# Patient Record
Sex: FEMALE | Race: WHITE | NOT HISPANIC OR LATINO | Employment: OTHER | URBAN - METROPOLITAN AREA
[De-identification: names, ages, dates, MRNs, and addresses within clinical notes are randomized per-mention and may not be internally consistent; named-entity substitution may affect disease eponyms.]

---
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
## 2020-02-07 ENCOUNTER — COMMUNICATION - HEALTHEAST (OUTPATIENT)
Dept: SCHEDULING | Facility: CLINIC | Age: 20
End: 2020-02-07

## 2020-02-10 ENCOUNTER — COMMUNICATION - HEALTHEAST (OUTPATIENT)
Dept: FAMILY MEDICINE | Facility: CLINIC | Age: 20
End: 2020-02-10

## 2020-02-10 ENCOUNTER — AMBULATORY - HEALTHEAST (OUTPATIENT)
Dept: FAMILY MEDICINE | Facility: CLINIC | Age: 20
End: 2020-02-10

## 2020-02-10 DIAGNOSIS — B37.0 THRUSH: ICD-10-CM

## 2020-07-05 ENCOUNTER — COMMUNICATION - HEALTHEAST (OUTPATIENT)
Dept: FAMILY MEDICINE | Facility: CLINIC | Age: 20
End: 2020-07-05

## 2020-07-05 DIAGNOSIS — B37.0 THRUSH: ICD-10-CM

## 2020-08-21 ENCOUNTER — COMMUNICATION - HEALTHEAST (OUTPATIENT)
Dept: FAMILY MEDICINE | Facility: CLINIC | Age: 20
End: 2020-08-21

## 2020-08-21 DIAGNOSIS — B37.0 THRUSH: ICD-10-CM

## 2020-09-18 ENCOUNTER — COMMUNICATION - HEALTHEAST (OUTPATIENT)
Dept: FAMILY MEDICINE | Facility: CLINIC | Age: 20
End: 2020-09-18

## 2020-09-18 DIAGNOSIS — Z30.9 ENCOUNTER FOR CONTRACEPTIVE MANAGEMENT, UNSPECIFIED TYPE: ICD-10-CM

## 2020-12-16 ENCOUNTER — COMMUNICATION - HEALTHEAST (OUTPATIENT)
Dept: FAMILY MEDICINE | Facility: CLINIC | Age: 20
End: 2020-12-16

## 2020-12-16 DIAGNOSIS — Z30.9 ENCOUNTER FOR CONTRACEPTIVE MANAGEMENT, UNSPECIFIED TYPE: ICD-10-CM

## 2021-01-22 ENCOUNTER — COMMUNICATION - HEALTHEAST (OUTPATIENT)
Dept: FAMILY MEDICINE | Facility: CLINIC | Age: 21
End: 2021-01-22

## 2021-01-22 DIAGNOSIS — B37.0 THRUSH: ICD-10-CM

## 2021-01-25 RX ORDER — FLUCONAZOLE 10 MG/ML
POWDER, FOR SUSPENSION ORAL
Qty: 70 ML | Refills: 0 | Status: SHIPPED | OUTPATIENT
Start: 2021-01-25 | End: 2022-10-12

## 2021-03-03 ENCOUNTER — COMMUNICATION - HEALTHEAST (OUTPATIENT)
Dept: FAMILY MEDICINE | Facility: CLINIC | Age: 21
End: 2021-03-03

## 2021-03-03 DIAGNOSIS — Z30.9 ENCOUNTER FOR CONTRACEPTIVE MANAGEMENT, UNSPECIFIED TYPE: ICD-10-CM

## 2021-05-27 NOTE — TELEPHONE ENCOUNTER
RN cannot approve Refill Request    RN can NOT refill this medication med is not covered by policy/route to provider.    Asa Sepulveda, [facility] Triage/Med Refill 4/9/2019    Requested Prescriptions   Pending Prescriptions Disp Refills     medroxyPROGESTERone (DEPO-PROVERA) 150 mg/mL injection [Pharmacy Med Name: medroxyPROGESTERone Acetate Intramuscular Suspension 150 MG/ML] 1 mL 0     Sig: INJECT 1 ML (150 MG TOTAL) INTO THE SHOULDER, THIGH, OR BUTTOCKS EVERY 3 (THREE) MONTHS.       There is no refill protocol information for this order            English

## 2021-05-27 NOTE — TELEPHONE ENCOUNTER
RN cannot approve Refill Request    RN can NOT refill this medication med is not covered by policy/route to provider.    Asa Sepulveda, [facility] Triage/Med Refill 4/9/2019    Requested Prescriptions   Pending Prescriptions Disp Refills     medroxyPROGESTERone (DEPO-PROVERA) 150 mg/mL injection 1 mL 0     Sig: Inject 1 mL (150 mg total) into the shoulder, thigh, or buttocks every 3 (three) months.       There is no refill protocol information for this order

## 2021-05-29 ENCOUNTER — RECORDS - HEALTHEAST (OUTPATIENT)
Dept: ADMINISTRATIVE | Facility: CLINIC | Age: 21
End: 2021-05-29

## 2021-05-29 NOTE — TELEPHONE ENCOUNTER
RN cannot approve Refill Request    RN can NOT refill this medication med is not covered by policy/route to provider.    Asa Sepulveda, [facility] Triage/Med Refill 6/12/2019    Requested Prescriptions   Pending Prescriptions Disp Refills     medroxyPROGESTERone (DEPO-PROVERA) 150 mg/mL injection 1 mL 0     Sig: Inject 1 mL (150 mg total) into the shoulder, thigh, or buttocks every 3 (three) months.       There is no refill protocol information for this order

## 2021-05-30 ENCOUNTER — RECORDS - HEALTHEAST (OUTPATIENT)
Dept: ADMINISTRATIVE | Facility: CLINIC | Age: 21
End: 2021-05-30

## 2021-05-31 VITALS — HEIGHT: 64 IN | WEIGHT: 131.5 LBS | BODY MASS INDEX: 22.45 KG/M2

## 2021-05-31 VITALS — WEIGHT: 132 LBS | HEIGHT: 64 IN | BODY MASS INDEX: 22.53 KG/M2

## 2021-05-31 NOTE — TELEPHONE ENCOUNTER
RN cannot approve Refill Request    RN can NOT refill this medication med is not covered by policy/route to provider.     Rupali Faustin, [facility] Triage/Med Refill 8/15/2019    Requested Prescriptions   Pending Prescriptions Disp Refills     hydrOXYzine HCl (ATARAX) 10 mg/5 mL syrup [Pharmacy Med Name: hydrOXYzine HCl Oral Syrup 10 MG/5ML] 473 mL 0     Sig: Take 12.5 mL (25 mg total) by mouth every 8 (eight) hours as needed for anxiety.       Antihistamine Refill Protocol Passed - 8/15/2019 12:38 PM        Passed - Patient has had office visit/physical in last year     Last office visit with prescriber/PCP: 1/28/2019 Alf Yeboah MD OR same dept: 1/28/2019 Alf Yeobah MD OR same specialty: 1/28/2019 Alf Yeboah MD  Last physical: 10/23/2018 Last MTM visit: Visit date not found   Next visit within 3 mo: Visit date not found  Next physical within 3 mo: Visit date not found  Prescriber OR PCP: Alf Yeboah MD  Last diagnosis associated with med order: There are no diagnoses linked to this encounter.  If protocol passes may refill for 12 months if within 3 months of last provider visit (or a total of 15 months).             pedi multivit 158-iron-vit K1 (CEROVITE JR) 18 mg iron- 10 mcg Chew [Pharmacy Med Name: Cerovite Jr Oral Tablet Chewable 60 MG] 150 tablet 10     Sig: CHEW AND SWALLOW TWO TABLETS DAILY       There is no refill protocol information for this order

## 2021-06-02 VITALS — WEIGHT: 133 LBS | HEIGHT: 64 IN | BODY MASS INDEX: 22.71 KG/M2

## 2021-06-02 VITALS — WEIGHT: 131 LBS | BODY MASS INDEX: 22.36 KG/M2 | HEIGHT: 64 IN

## 2021-06-04 NOTE — TELEPHONE ENCOUNTER
Refill Approved    Rx renewed per Medication Renewal Policy. Medication was last renewed on 8/15/19.    Effie De La Paz, [facility] Triage/Med Refill 12/23/2019     Requested Prescriptions   Pending Prescriptions Disp Refills     hydrOXYzine HCl (ATARAX) 10 mg/5 mL syrup [Pharmacy Med Name: hydrOXYzine HCl Oral Syrup 10 MG/5ML] 473 mL 0     Sig: Take 12.5 mL (25 mg total) by mouth every 8 (eight) hours as needed for anxiety.       Antihistamine Refill Protocol Passed - 12/20/2019 12:35 PM        Passed - Patient has had office visit/physical in last year     Last office visit with prescriber/PCP: 1/28/2019 Alf Yeboah MD OR same dept: 1/28/2019 Alf Yeboah MD OR same specialty: 1/28/2019 Alf Yeboah MD  Last physical: 10/23/2018 Last MTM visit: Visit date not found   Next visit within 3 mo: Visit date not found  Next physical within 3 mo: Visit date not found  Prescriber OR PCP: Alf Yeboah MD  Last diagnosis associated with med order: 1. Mood disorder (H)  - hydrOXYzine HCl (ATARAX) 10 mg/5 mL syrup [Pharmacy Med Name: hydrOXYzine HCl Oral Syrup 10 MG/5ML]; Take 12.5 mL (25 mg total) by mouth every 8 (eight) hours as needed for anxiety.  Dispense: 473 mL; Refill: 0    If protocol passes may refill for 12 months if within 3 months of last provider visit (or a total of 15 months).

## 2021-06-05 NOTE — TELEPHONE ENCOUNTER
Pt mother called in states she want to request mouth thrush medication for the Pt.    Please advise      Carson Owens RN, [facility] Triage/Med Refill 2/7/2020 7:19 PM

## 2021-06-06 NOTE — TELEPHONE ENCOUNTER
Medication Question or Clarification  Who is calling: Mother and patient  What medication are you calling about (include dose and sig)?:   fluconazole (DIFLUCAN) 40 mg/mL suspension 17.5 mL 0 2/10/2020 2/17/2020    Sig - Route: Take 2.5 mL (100 mg total) by mouth daily for 7 doses. - Oral    Sent to pharmacy as: fluconazole 40 mg/mL oral suspension (DIFLUCAN)        Who prescribed the medication?: Dr Yeboah  What is your question/concern?: Patient wants pill form of this medication.  Please send new form to pharmacy and call when sent.  Requested [facility]acy: [facility]  Okay to leave a detailed message?: Yes

## 2021-06-09 NOTE — TELEPHONE ENCOUNTER
RN cannot approve Refill Request    RN can NOT refill this medication med is not covered by policy/route to provider. Last office visit: 1/28/2019 Alf Yeboah MD Last Physical: 10/23/2018 Last MTM visit: Visit date not found Last visit same specialty: 1/28/2019 Alf Yeboah MD.  Next visit within 3 mo: Visit date not found  Next physical within 3 mo: Visit date not found      Brianda Malik, [facility] Triage/Med Refill 7/5/2020    Requested Prescriptions   Pending Prescriptions Disp Refills     fluconazole (DIFLUCAN) 40 mg/mL suspension [Pharmacy Med Name: Fluconazole Oral Suspension Reconstituted 40 MG/ML] 35 mL 0     Sig: TAKE 2.5 ML (100 MG TOTAL) BY MOUTH DAILY FOR 7 DAYS. DISCARD REMAINDER       There is no refill protocol information for this order

## 2021-06-10 NOTE — TELEPHONE ENCOUNTER
RN cannot approve Refill Request    RN can NOT refill this medication med is not covered by policy/route to provider. Last office visit: 1/28/2019 Alf Yeboah MD Last Physical: 10/23/2018 Last MTM visit: Visit date not found Last visit same specialty: 1/28/2019 Alf Yeboah MD.  Next visit within 3 mo: Visit date not found  Next physical within 3 mo: Visit date not found      Andreia Corona, [facility] Triage/Med Refill 8/22/2020    Requested Prescriptions   Pending Prescriptions Disp Refills     fluconazole (DIFLUCAN) 10 mg/mL suspension [Pharmacy Med Name: Fluconazole Oral Suspension Reconstituted 10 MG/ML] 70 mL 0     Sig: TAKE 10 ML (100 MG) BY MOUTH ONCE DAILY FOR 7 DAYS.       There is no refill protocol information for this order

## 2021-06-10 NOTE — TELEPHONE ENCOUNTER
Alfredo Yeboah     It's Km I'm writing you because Dea needs a new prescription for her mouth problem the medication is called   Fluconazole   Right now I'm not feeling the best and Piper just told me she's having that mouth problem again and because we cannot get into the dentist yet I'm emailing you     Km moody mother of Dea Moody

## 2021-06-11 NOTE — TELEPHONE ENCOUNTER
RN cannot approve Refill Request    RN can NOT refill this medication Protocol failed and NO refill given. Last office visit: 1/28/2019 Alf Yeboah MD Last Physical: 10/23/2018 Last MTM visit: Visit date not found Last visit same specialty: 1/28/2019 Alf Yeboah MD.  Next visit within 3 mo: Visit date not found  Next physical within 3 mo: Visit date not found      Andreia Corona, [facility] Triage/Med Refill 9/19/2020    Requested Prescriptions   Pending Prescriptions Disp Refills     medroxyPROGESTERone 150 mg/mL Syrg [Pharmacy Med Name: medroxyPROGESTERone Acetate Intramuscular Suspension Prefilled Syringe 150 MG/ML] 1 mL 0     Sig: INJECT 1 ML (150 MG TOTAL) INTO THE SHOULDER, THIGH, OR BUTTOCKS EVERY 3 (THREE) MONTHS.       There is no refill protocol information for this order

## 2021-06-13 NOTE — PROGRESS NOTES
[facility] Well Child Check    ASSESSMENT & PLAN  Dea Garcia is a 16  y.o. 9  m.o. who has normal growth and normal development.    Diagnoses and all orders for this visit:    Hearing deficit, right  -     Ambulatory referral to Audiology    Poor dentition  -     Ambulatory referral to Dentistry    Encounter for vision screening  -     Amb referral to Pediatric Ophthalmology    Acne vulgaris  -     benzoyl peroxide 7 % Clsr; Apply  Night to face and back  Dispense: 1 Bottle; Refill: 12    Vitamin D deficiency  -     cholecalciferol, vitamin D3, (CHILDREN'S VITAMIN D) 400 unit Chew; Chew 2 tablets daily  Dispense: 100 tablet; Refill: 12    Other orders  -     pediatric multivitamin (ANIMAL SHAPE VITAMINS) Chew chewable tablet; CHEW AND SWALLOW TWO TABLETS DAILY  Dispense: 150 each; Refill: 11  -     clindamycin (CLINDAGEL) 1 % gel; Apply to face at bedtime  Dispense: 60 g; Refill: 3      Return to clinic in 1 year for a Well Child Check or sooner as needed    IMMUNIZATIONS/LABS  Immunizations were reviewed and orders were placed as appropriate.    REFERRALS  Dental:  Recommend routine dental care as appropriate.  Other:  Patient was referred back to me for dental and Ophthalmologist     ANTICIPATORY GUIDANCE  Nutrition:  Junk Food    HEALTH HISTORY  Do you have any concerns that you'd like to discuss today?: BACK AND CHEST PAIN ON AND OFF       Refills needed? No    Do you have any forms that need to be filled out? No        Do you have any significant health concerns in your family history?: Yes: DM, LUPUS, THYROID, CHOLESTEROL, DEPRESSION, ANXIETY    Family History   Problem Relation Age of Onset     Anxiety disorder Mother      Anxiety disorder Maternal Aunt      Since your last visit, have there been any major changes in your family, such as a move, job change, separation, divorce, or death in the family?: No    Home  Who lives in your home?:  MOM, GRANDMA, AUNTY, SISTER, AND PT   Social History      Social History Narrative     Do you have any trouble with sleep?:  No    Education  What school does your child attend?:  [employer]   What grade is your child in?:  11th  How does the patient perform in school (grades, behavior, attention, homework?: AUTISM AND DELAY. DISCUSS TODAY WITH DOCTOR, DOING BETTER.      Eating  Does patient eat regular meals including fruits and vegetables?:  yes  What is the patient drinking (cow's milk, water, soda, juice, sports drinks, energy drinks, etc)?: soda  Does patient have concerns about body or appearance?:  No    Activities  Does the patient have friends?:  no, WHERE THEY LIVE NOT A LOT OF KIDS AND HOMEBODY. PT LIKES TO STAY HOME.   Does the patient get at least one hour of physical activity per day?:  Yes, WALKING.   Does the patient have less than 2 hours of screen time per day (aside from homework)?:  No, WILL USE TABLET FOR GAMES AND SCHOOL WORK.   What does your child do for exercise?:  PT DOES A LOT OF WALKING   Does the patient have interest/participate in community activities/volunteers/school sports?:  no    MENTAL HEALTH SCREENING  PHQ-2 Total Score: 0 (9/21/2017  1:27 PM)  No Data Recorded    VISION/HEARING  Vision: Completed. See Results  Hearing:  Completed. See Results     Hearing Screening    125Hz 250Hz 500Hz 1000Hz 2000Hz 3000Hz 4000Hz 6000Hz 8000Hz   Right ear:   0 0 40  40     Left ear:   40 40 20  20        Visual Acuity Screening    Right eye Left eye Both eyes   Without correction: 20/32 20/32 20/32   With correction:          TB Risk Assessment:  The patient and/or parent/guardian answer positive to:  NONE    Dental  Is your child being seen by a dentist?  Yes  Flouride Varnish Application Screening  Is child seen by dentist?     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     Acne     Childhood Onset Pervasive Developmental Autistic Disorder Full Syndrome Present     Overweight     Tachycardia      "Hypercalcemia     Vasovagal Syncope     Abdominal Pain Of Childhood     Dysfunctional Uterine Bleeding     Attention-deficit Hyperactivity Disorder       Drugs  Does the patient use tobacco/alcohol/drugs?:  no    Safety  Does the patient have any safety concerns (peer or home)?:  no  Does the patient use safety belts, helmets and other safety equipment?:  no    Sex  Is the patient sexually active?:  no    MEASUREMENTS  Height:  5' 3.5\" (1.613 m)  Weight: 131 lb 8 oz (59.6 kg)  BMI: Body mass index is 22.93 kg/(m^2).  Blood Pressure: 122/76  Blood pressure percentiles are 85 % systolic and 82 % diastolic based on NHBPEP's 4th Report. Blood pressure percentile targets: 90: 125/80, 95: 128/84, 99 + 5 mmH/97.    PHYSICAL EXAM  Physical:  General Appearance: Healthy-appearingy.   Eyes: Sclerae white, pupils equal and reactive, red reflex normal bilaterally   Ears: Well-positioned, well-formed pinnae; TM pearly white, translucent, no bulging   Nose: Clear, normal mucosa   Throat: Lips, tongue, and mucosa are moist, pink and intact; tongue no thrush   Neck: Supple, symmetric ROM  Chest: Lungs clear to auscultation, no retractions  Heart: Regular rate & rhythm, S1 S2, no murmur  Abdomen: Soft, non-tender, no masses; umbilical area normal   Pulses: Equal femoral pulses  Extremities: Well-perfused, warm and dry kyphosis  Neuro: Easily aroused good tone      "

## 2021-06-13 NOTE — TELEPHONE ENCOUNTER
RN cannot approve Refill Request    RN can NOT refill this medication med is not covered by policy/route to provider. Last office visit: 1/28/2019 Alf Yeboah MD Last Physical: 10/23/2018 Last MTM visit: Visit date not found Last visit same specialty: 1/28/2019 Alf Yeboah MD.  Next visit within 3 mo: Visit date not found  Next physical within 3 mo: Visit date not found      Rupali Faustin, [facility] Triage/Med Refill 12/18/2020    Requested Prescriptions   Pending Prescriptions Disp Refills     medroxyPROGESTERone 150 mg/mL Syrg [Pharmacy Med Name: medroxyPROGESTERone Acetate Intramuscular Suspension Prefilled Syringe 150 MG/ML] 1 mL 0     Sig: INJECT 1 ML (150 MG TOTAL) INTO THE SHOULDER, THIGH, OR BUTTOCKS EVERY 3 (THREE) MONTHS.       There is no refill protocol information for this order

## 2021-06-14 ENCOUNTER — RECORDS - HEALTHEAST (OUTPATIENT)
Dept: ADMINISTRATIVE | Facility: OTHER | Age: 21
End: 2021-06-14

## 2021-06-14 DIAGNOSIS — Z30.9 ENCOUNTER FOR CONTRACEPTIVE MANAGEMENT, UNSPECIFIED TYPE: ICD-10-CM

## 2021-06-14 RX ORDER — MEDROXYPROGESTERONE ACETATE 150 MG/ML
INJECTION, SUSPENSION INTRAMUSCULAR
Qty: 1 ML | Refills: 1 | Status: SHIPPED | OUTPATIENT
Start: 2021-06-14 | End: 2021-12-21

## 2021-06-14 NOTE — TELEPHONE ENCOUNTER
RN cannot approve Refill Request    RN can NOT refill this medication med is not covered by policy/route to provider. Last office visit: 1/28/2019 Alf Yeboah MD Last Physical: 10/23/2018 Last MTM visit: Visit date not found Last visit same specialty: 1/28/2019 Alf Yeboah MD.  Next visit within 3 mo: Visit date not found  Next physical within 3 mo: Visit date not found      Yana Schneider, [facility] Triage/Med Refill 1/22/2021    Requested Prescriptions   Pending Prescriptions Disp Refills     fluconazole (DIFLUCAN) 10 mg/mL suspension [Pharmacy Med Name: Fluconazole Oral Suspension Reconstituted 10 MG/ML] 70 mL 0     Sig: TAKE 10 ML (100 MG) BY MOUTH ONCE DAILY FOR 7 DAYS.       There is no refill protocol information for this order

## 2021-06-14 NOTE — PROGRESS NOTES
Audiology only; referred by Alf Yeboah    History:  Referred on PCP hearing screening 9-21-17. Mom reported a strong familial history of hearing loss, citing various etiologies. There are no concerns for Aracelis hearing at home, and academics are not suffering at present. Dea denied any recent illness, otalgia, or otorrhea. She has a diagnosis of being on the autism spectrum.    Results:  Otoscopy was unremarkable in either ear; both canals were clear.  Conventional audiometry; good reliability using circumaural phones.  Normal hearing sensitivity, bilaterally, for 250-8000 Hz.  Speech reception thresholds showed agreement with frequency-specific responses for each ear.  Tympanometry was consistent with normal middle ear function, bilaterally.    Recommendations:  Follow-up with PCP; retest hearing per medical management or patient/parental concern.  Hearing sensitivity and middle ear function are both adequate at this time in both ears for continued development and academic progress.    Chaparro Garibay, [facility]  [place] Licensed Audiologist [id number]

## 2021-06-15 NOTE — PROGRESS NOTES
"ASSESSMENT & PLAN      Dea was seen today for shortness of breath.    Diagnoses and all orders for this visit:    Tachycardia  -     Comprehensive Metabolic Panel  -     HM1(CBC and Differential)  -     Vitamin D, Total (25-Hydroxy)  -     Cancel: Urinalysis-UC if Indicated  -     Vitamin B12  -     Thyroid Cascade  -     Magnesium  -     Urinalysis-UC if Indicated  -     Thyroid Peroxidase Antibody; Future  -     HM1 (CBC with Diff)  -     Thyroid Peroxidase Antibody  -     Culture, Urine    Mood disorder  -     Ambulatory referral to Psychology    Hypokalemia  -     Comprehensive Metabolic Panel  -     Thyroid Cascade  -     Magnesium      No Follow-up on file.           CHIEF COMPLAINT: Dea Garcia had concerns including Shortness of Breath.    [place]: 1.............. had concerns including Shortness of Breath.    1. Tachycardia    2. Mood disorder    3. Hypokalemia      No problem-specific Assessment & Plan notes found for this encounter.      CC:              Shortness of breath without any exertion    Panicky\"    New Symptom  Why are cry?   Sib and Rents    School Home School  Since      What's it like:                      Hard to breathe  How long is it ongoin month symptoms sporadically since thanksgiving  What makes it worse :       Sure may be situational  What makes it better:         Taking a timeout relaxing  0/10-10/10:Pain/Intesity     sometimes severe    Any associated Sx to above complaint:   Hard to breathe    Family history significant significant for anxiety lives with her mom, on T, grandmother, sister  Being homeschooled   tobacco use but exposure  Plan Depo-Provera bowel and bladder okay    SUBJECTIVE:  Dea Garcia is a 17 y.o. female    No past medical history on file.  No past surgical history on file.  Azithromycin; Influenza virus vaccine whole; and Red dye  Current Outpatient Prescriptions   Medication Sig Dispense Refill     benzoyl peroxide 7 % " Clsr Apply  Night to face and back 1 Bottle 12     cholecalciferol, vitamin D3, (CHILDREN'S VITAMIN D) 400 unit Chew Chew 2 tablets daily 100 tablet 12     clindamycin (CLINDAGEL) 1 % gel Apply to face at bedtime 60 g 3     pediatric multivitamin (ANIMAL SHAPE VITAMINS) Chew chewable tablet CHEW AND SWALLOW TWO TABLETS DAILY 150 each 11     hydrOXYzine HCl (ATARAX) 25 MG tablet Take 1 tablet (25 mg total) by mouth every 8 (eight) hours as needed. For anxiety symptoms. 28 tablet 0     Current Facility-Administered Medications   Medication Dose Route Frequency Provider Last Rate Last Dose     medroxyPROGESTERone injection 150 mg (DEPO-PROVERA)  150 mg Intramuscular Q3 Months Alf Yeboah MD   150 mg at 11/16/17 1446     Family History   Problem Relation Age of Onset     Anxiety disorder Mother      Anxiety disorder Maternal Aunt      Social History     Social History     Marital status: Single     Spouse name: N/A     Number of children: N/A     Years of education: N/A     Social History Main Topics     Smoking status: Never Smoker     Smokeless tobacco: Never Used     Alcohol use No     Drug use: No     Sexual activity: Not Currently     Other Topics Concern     None     Social History Narrative     Patient Active Problem List   Diagnosis     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 She has never used smokeless tobacco. She reports that she does not drink alcohol or use illicit drugs.    REVIEW OF SYSTEMS:   Family history not pertinent to chief complaint or presenting problem    Review of systems otherwise negative as requested from patient, except   Those  "positive ROS outlined and discussed in [place].    OBJECTIVE:  /60 (Patient Site: Left Arm, Patient Position: Sitting, Cuff Size: Adult Regular)  Pulse 102  Temp 98.1  F (36.7  C) (Oral)   Ht 5' 3.5\" (1.613 m)  Wt 132 lb (59.9 kg)  SpO2 98%  BMI 23.02 kg/m2    GENERAL:     No acute distress.   Alert and oriented X 3         Physical:    Full range of affect  Extra tooth upper gumline on the right with decay  Some gingival changes  Oropharynx clear  Neck is supple no cervical or subclavicular nodes  Lungs are clear  Cardiac S1-S2 regular sinus appreciable murmur gallop  Abdomen is soft nontender no appreciable stool in the left lower quadrant or right lower quadrant no rebound or guarding no appreciable mass CVAs nontender no appreciable inguinal hernia    Recent Results (from the past 240 hour(s))   Comprehensive Metabolic Panel   Result Value Ref Range    Sodium 141 136 - 145 mmol/L    Potassium 3.6 3.5 - 5.0 mmol/L    Chloride 107 98 - 107 mmol/L    CO2 23 22 - 31 mmol/L    Anion Gap, Calculation 11 5 - 18 mmol/L    Glucose 85 70 - 125 mg/dL    BUN 7 (L) 9 - 18 mg/dL    Creatinine 0.76 0.60 - 1.10 mg/dL    GFR MDRD Af Amer  >60 mL/min/1.73m2    GFR MDRD Non Af Amer  >60 mL/min/1.73m2    Bilirubin, Total 0.7 0.0 - 1.0 mg/dL    Calcium 10.0 8.5 - 10.5 mg/dL    Protein, Total 7.6 6.0 - 8.0 g/dL    Albumin 4.3 3.5 - 5.0 g/dL    Alkaline Phosphatase 79 50 - 364 U/L    AST 12 0 - 40 U/L    ALT 10 0 - 45 U/L   Vitamin D, Total (25-Hydroxy)   Result Value Ref Range    Vitamin D, Total (25-Hydroxy) 24.1 (L) 30.0 - 80.0 ng/mL   Vitamin B12   Result Value Ref Range    Vitamin B-12 455 213 - 816 pg/mL   Thyroid Cascade   Result Value Ref Range    TSH 2.57 0.30 - 5.00 uIU/mL   Magnesium   Result Value Ref Range    Magnesium 2.0 1.8 - 2.6 mg/dL   HM1 (CBC with Diff)   Result Value Ref Range    WBC 5.9 4.5 - 13.0 thou/uL    RBC 4.84 4.10 - 5.10 mill/uL    Hemoglobin 14.1 12.0 - 16.0 g/dL    Hematocrit 42.2 33.0 - 51.0 % "    MCV 87 78 - 102 fL    MCH 29.2 25.0 - 35.0 pg    MCHC 33.5 32.0 - 36.0 g/dL    RDW 11.3 (L) 11.5 - 14.0 %    Platelets 251 140 - 440 thou/uL    MPV 8.5 7.0 - 10.0 fL    Neutrophils % 58 34 - 64 %    Lymphocytes % 32 25 - 45 %    Monocytes % 8 (H) 3 - 6 %    Eosinophils % 2 0 - 3 %    Basophils % 1 0 - 1 %    Neutrophils Absolute 3.4 1.5 - 9.5 thou/uL    Lymphocytes Absolute 1.9 1.1 - 6.0 thou/uL    Monocytes Absolute 0.5 0.1 - 0.8 thou/uL    Eosinophils Absolute 0.1 0.0 - 0.4 thou/uL    Basophils Absolute 0.0 0.0 - 0.1 thou/uL   Thyroid Peroxidase Antibody   Result Value Ref Range    Thyroid Peroxidase Ab <3.0 0.0 - 5.6 IU/mL   Urinalysis-UC if Indicated   Result Value Ref Range    Color, UA Yellow Colorless, Yellow, Straw, Light Yellow    Clarity, UA Clear Clear    Glucose, UA Negative Negative    Bilirubin, UA Negative Negative    Ketones, UA Negative Negative    Specific Gravity, UA 1.020 1.005 - 1.030    Blood, UA Negative Negative    pH, UA 6.5 5.0 - 8.0    Protein, UA Negative Negative mg/dL    Urobilinogen, UA 0.2 E.U./dL 0.2 E.U./dL, 1.0 E.U./dL    Nitrite, UA Negative Negative    Leukocytes, UA Small (!) Negative    Bacteria, UA Few (!) None Seen hpf    RBC, UA 0-2 None Seen, 0-2 hpf    WBC, UA 0-5 None Seen, 0-5 hpf    Squam Epithel, UA 10-25 (!) None Seen, 0-5 lpf    Trans Epithel, UA 0-5 (!) None Seen lpf    Mucus, UA Few (!) None Seen lpf   Culture, Urine   Result Value Ref Range    Culture No Growth            ASSESSMENT & PLAN      Dea was seen today for shortness of breath.    Diagnoses and all orders for this visit:    Tachycardia  -     Comprehensive Metabolic Panel  -     HM1(CBC and Differential)  -     Vitamin D, Total (25-Hydroxy)  -     Cancel: Urinalysis-UC if Indicated  -     Vitamin B12  -     Thyroid Cascade  -     Magnesium  -     Urinalysis-UC if Indicated  -     Thyroid Peroxidase Antibody; Future  -     HM1 (CBC with Diff)  -     Thyroid Peroxidase Antibody  -     Culture,  Urine    Mood disorder  -     Ambulatory referral to Psychology    Hypokalemia  -     Comprehensive Metabolic Panel  -     Thyroid Cascade  -     Magnesium    Differential diagnosis asthma versus panic attack or anxiety disorder we will have the patient follow through with psychology Vistaril as needed for anxiety symptoms I would rather have her learn some behavioral techniques to short circuit and any anxiety symptoms recommend a stress releasing device for squeezing  Recommended regular exercise restorative sleep as well as good calorie  No Follow-up on file.       Anticipatory Guidance and Symptomatic Cares Discussed   Advised to call back directly if there are further questions, or if these symptoms fail to improve as anticipated or worsen.  Return to clinic if patient has a clinical concern that warrants an exam.         25 Min Total Time, > 50% counseling and coordination of Care    Alf Yeboah MD  [facility] [address] [phone]

## 2021-06-15 NOTE — TELEPHONE ENCOUNTER
RN cannot approve Refill Request    RN can NOT refill this medication med is not covered by policy/route to provider. Last office visit: 1/28/2019 Alf Yeboah MD Last Physical: 10/23/2018 Last MTM visit: Visit date not found Last visit same specialty: 1/28/2019 Alf Yeboah MD.  Next visit within 3 mo: Visit date not found  Next physical within 3 mo: Visit date not found      Trina Chi, Care Connection Triage/Med Refill 3/3/2021    Requested Prescriptions   Pending Prescriptions Disp Refills     medroxyPROGESTERone 150 mg/mL Syrg [Pharmacy Med Name: medroxyPROGESTERone Acetate Intramuscular Suspension Prefilled Syringe 150 MG/ML] 1 mL 0     Sig: INJECT 1 ML (150 MG TOTAL) INTO THE SHOULDER, THIGH, OR BUTTOCKS EVERY 3 (THREE) MONTHS.       There is no refill protocol information for this order

## 2021-06-16 PROBLEM — E53.8 VITAMIN B12 DEFICIENCY (NON ANEMIC): Status: ACTIVE | Noted: 2018-12-17

## 2021-06-16 PROBLEM — E55.9 VITAMIN D DEFICIENCY: Status: ACTIVE | Noted: 2018-12-17

## 2021-06-16 PROBLEM — F32.5 MAJOR DEPRESSIVE DISORDER WITH SINGLE EPISODE, IN REMISSION (H): Status: ACTIVE | Noted: 2018-12-12

## 2021-06-16 NOTE — TELEPHONE ENCOUNTER
Telephone Encounter by Abby Everett at 1/17/2019  1:49 PM     Author: Abby Everett Service: -- Author Type: --    Filed: 1/17/2019  1:50 PM Encounter Date: 1/15/2019 Status: Signed    : Abby Everett       [facility] team  [phone]    PA has been initiated.

## 2021-06-19 NOTE — LETTER
Letter by Alf Yeboah MD at      Author: Alf Yeboah MD Service: -- Author Type: --    Filed:  Encounter Date: 4/17/2019 Status: (Other)         April 17, 2019     Patient: Dea Garcia   YOB: 2000   Date of Visit: 4/17/2019       To Whom It May Concern:    It is my medical opinion that Dea Garcia has been a patient of mine and/or I have been involved in her care since 2007, both here at the [facility] and at the [facility] in [place].    Currently she carries the following diagnoses:    Major depressive disorder  Childhood onset pervasive developmental autistic disorder  Attention deficit hyperactivity disorder    At this juncture, I believe it is reasonable to explore appointing a guardianship for Dea.  However I cannot say with certainty that Dea has the inability to care for herself or make her own healthcare decisions. I believe I would defer to the input from a licensed psychologist.      I believe she would be able to attend the hearing regarding her guardianship.              If you have any questions or concerns, please don't hesitate to call.    Sincerely,        Electronically signed by Alf Yeboah MD

## 2021-06-19 NOTE — LETTER
Letter by Alf Yeboah MD at      Author: Alf Yeboah MD Service: -- Author Type: --    Filed:  Encounter Date: 6/25/2019 Status: (Other)         [place]perd[place] KAY Jose  [address] [address]      June 25, 2019      Dear Ms. Garcia,     At [facility], we care about your health and well-being. Your primary care provider is committed to ensuring you receive high quality care and has chosen a network of specialists to assist in providing that care. Recently Dr. Yeboah referred you to [facility] for specialty care.        It is important to your overall health to follow through with the recommendation from your provider. Please call [phone] at your earliest convenience for assistance in scheduling an appointment.  If you have already scheduled this appointment, please disregard this notice.        Sincerely,        Electronically signed by Alf Yeboah MD

## 2021-06-21 NOTE — PROGRESS NOTES
(11/07)Dea's mother Courtney return message left by Argenis BARRETT, Clinic Care Coordination (CCC) Care Guide (CG), following orders for Ambulatory Referral to Care Management. Mother stated she would like orders to remain open for pt and herself and would like to pursue CCC services.    (11/08)   Argenis BARRETT, Clinic Care Coordination (CCC) Care Guide (CG), returned call to Emreald mother Courtney to offer CCC. SONNY has also sent [employer] message. Attempt 1: Care Guide called patient.  If this patient is returning my call, please transfer to  Argenis at ext [phone] MWF or [phone] on T/TH.    Next outreach: 11/15/18    Plan:  -Offer CCC

## 2021-06-21 NOTE — PROGRESS NOTES
[facility] Well Child Check    ASSESSMENT & PLAN  Dea Garcia is a 17  y.o. 10  m.o. who has normal growth and normal development.    Diagnoses and all orders for this visit:    WCC (well child check)    Dental disorder    Midline thoracic back pain, unspecified chronicity  -     XR Thoracic Spine 2 VWS; Future; Expected date: 10/23/18  -     XR Lumbar Spine 2 or 3 VWS; Future; Expected date: 10/23/18      Return to clinic in 1 year for a Well Child Check or sooner as needed    IMMUNIZATIONS/LABS  Immunizations were reviewed and orders were placed as appropriate.    REFERRALS  Dental:  Recommend routine dental care as appropriate.  Other:  None     ANTICIPATORY GUIDANCE  Nutrition:  Junk Food    HEALTH HISTORY  Do you have any concerns that you'd like to discuss today?: No concerns       Roomed by: Nicho    Accompanied by Mother    Refills needed? Yes benzoyl peroxide, multi vitamin, vitamin d, clindamyacin   Do you have any forms that need to be filled out? No    Are you using a form of contraception? Yes        Do you have any significant health concerns in your family history?: Yes  Family History   Problem Relation Age of Onset     Anxiety disorder Mother      Anxiety disorder Maternal Aunt      Since your last visit, have there been any major changes in your family, such as a move, job change, separation, divorce, or death in the family?: No  Has a lack of transportation kept you from medical appointments?: Yes    Home  Who lives in your home?:  Mom, grandmother, aunt, sister  Social History     Social History Narrative     Do you have any concerns about losing your housing?: No  Is your housing safe and comfortable?: Yes  Do you have any trouble with sleep?:  No    Education  What school do you child attend?:  homeschool  What grade are you in?:  12  grade   How do you perform in school (grades, behavior, attention, homework?: does well     Eating  Do you eat regular meals including fruits and  "vegetables?:  no  What are you drinking (cow's milk, water, soda, juice, sports drinks, energy drinks, etc)?: soda and juice  Have you been worried that you don't have enough food?: No  Do you have concerns about your body or appearance?:  No    Activities  Do you have friends?:  yes  Do you get at least one hour of physical activity per day?:  yes  How many hours a day are you in front of a screen other than for schoolwork (computer, TV, phone)?:  6  What do you do for exercise?:  walks  Do you have interest/participate in community activities/volunteers/school sports?:  yes    MENTAL HEALTH SCREENING  PHQ-2 Total Score: 2 (10/23/2018  2:52 PM)  PHQ-9 Total Score: 5 (10/23/2018  2:52 PM)    VISION/HEARING  Vision: Not done: mother declined  Hearing:  Not done: mother declined    No exam data present    TB Risk Assessment:  The patient and/or parent/guardian answer positive to:  patient and/or parent/guardian answer 'no' to all screening TB questions    Dyslipidemia Risk Screening  Have either of your parents or any of your grandparents had a stroke or heart attack before age 55?: No  Any parents with high cholesterol or currently taking medications to treat?: No     Dental  When was the last time you saw the dentist?: 6-12 months ago   [facility]  [place]    no second insurance  fo Orthodontist    Patient Active Problem List   Diagnosis     Allergy To Pollens Weeds Ragweed     Acne     Childhood Onset Pervasive Developmental Autistic Disorder Full Syndrome Present     Tachycardia     Vasovagal Syncope     Dysfunctional Uterine Bleeding     Attention-deficit Hyperactivity Disorder       Drugs  Does the patient use tobacco/alcohol/drugs?:  no    Safety  Does the patient have any safety concerns (peer or home)?:  no  Does the patient use safety belts, helmets and other safety equipment?:  yes    Sex  Have you ever had sex?:  No    MEASUREMENTS  Height:  5' 4.37\" (1.635 m)  Weight: 131 lb (59.4 kg)  BMI: Body " mass index is 22.23 kg/(m^2).  Blood Pressure: 98/68  Blood pressure percentiles are 7 % systolic and 59 % diastolic based on the 2017 AAP Clinical Practice Guideline. Blood pressure percentile targets: 90: 125/78, 95: 128/82, 95 + 12 mmH/94.    PHYSICAL EXAM  Physical:  General Appearance: Healthy-appearingy.   Head:  No deformity  Eyes: Sclerae white, pupils equal and reactive, red reflex normal bilaterally   Ears: Well-positioned, well-formed pinnae; TM pearly white, translucent, no bulging   Nose: Clear, normal mucosa   Throat: Lips, tongue, and mucosa are moist, pink and intact; tongue no thrush   Neck: Supple, symmetric ROM no nodes  Chest: Lungs clear to auscultation, no retractions  Heart: Regular rate & rhythm, S1 S2, no murmur  Abdomen: Soft, non-tender, no masses; umbilical area normal   Pulses: Equal femoral pulses  : No hernia palpable   Extremities: Well-perfused, warm and dry, kyphosis with mild  scoliosis  Neuro: Easily aroused good tone

## 2021-06-21 NOTE — PROGRESS NOTES
Argenis BARRETT, Clinic Care Coordination (CCC) Care Guide (CG), has reviewed patient chart upon reception of order for Ambulatory Referral to Care Management.      Order Placed: 10/23/18                                                                  First outreach:10/24/18   Second outreach: 11/02/18                                                                  Outcome: LMTCB                  Next outreach: 11/07/18    Plan:  -Offer CCC

## 2021-06-21 NOTE — PROGRESS NOTES
Argenis BARRETT, Clinic Care Coordination (CCC) Care Guide (CG), has reviewed patient chart upon reception of order for Ambulatory Referral to Care Management.       Order Placed: 10/23/18                                                                  First outreach:10/24/18   Second outreach: 11/02/18      Third outreach: 11/07/18                                                              Outcome: LMTCB                   No further outreach will be done.

## 2021-06-21 NOTE — PROGRESS NOTES
Argenis BARRETT, Clinic Care Coordination (CCC) Care Guide (CG), has reviewed patient chart upon reception of order for Ambulatory Referral to Care Management.     Order Placed: 10/23/18     First outreach:10/24/18     Outcome: LMTCB        Was order discussed with patient during a recent PCP communication: Unknown    Order comments: Dental/ housing and insurance  Care Guide comments: Pt and her mother Km both referred. Left VM on mother's phone for both.    Last OV with PCP: 10/23/18    Previously enrolled in CCC: No    Next outreach: 10/31/18    Plan:  -Offer CCC

## 2021-06-22 NOTE — PROGRESS NOTES
Patient comes in lab only history of depression symptoms expressing depressed mood, decreased interest in activities, psychomotor retardation agitation, occasional feeling guilt worthlessness, diminished concentration    And occasionally thoughts of dying    Mostly symptoms stem from her grandmother's passing    She is possibly interested in antidepressants    Interested Genesight testing to tailor the most appropriate medications    Will send for Genesight testing consider addition of medications based on testing profile    Hedy

## 2021-06-22 NOTE — PROGRESS NOTES
Attempt 3: Argenis BARRETT, Clinic Care Coordination (CCC) Care Guide (CG), sent [employer] message to Piper's mother Km to offer re-schedule of RN assessment for CCC enrollment per order for Ambulatory Referral to Care Management. Message is in patient's mother's chart due to patient's mother also being referred to CCC.    First outreach: 10/24/18  Second outreach:11/02/18  Third outreach: 11/07/18  Fourth outreach (first attempt): 11/08/18  Fifth outreach (first attempt): 11/19/18  Sixth outreach (second attempt): 11/27/18  Seventh outreach: (third attempt): 12/03/18   Outcome: Sent [employer] message; previous two were read with no response    No further outreach will be done; referral closed.

## 2021-06-22 NOTE — TELEPHONE ENCOUNTER
Pt wants depo injection ordered to her pharmacy so she can do them at home.    Pt will be done for next injection 1/23/19

## 2021-06-23 NOTE — TELEPHONE ENCOUNTER
Fax received from [facility] pharmacy requesting Prior Authorization    Medication Name: Benzoyl Peroxide 7% creamy wash    Insurance Plan:    PBM: [employer]   Patient ID Number: [id number]    Please start PA process

## 2021-06-23 NOTE — PROGRESS NOTES
"ASSESSMENT & PLAN    No problem-specific Assessment & Plan notes found for this encounter.      Dea was seen today for concerns, immunizations and labs only.    Diagnoses and all orders for this visit:    Vitamin D deficiency  -     Vitamin D, Total (25-Hydroxy)    Vasovagal Syncope    Vitamin B12 deficiency (non anemic)  -     Vitamin B12    Childhood Onset Pervasive Developmental Autistic Disorder Full Syndrome Present    Major depressive disorder with single episode, in remission (H)  -     Comprehensive Metabolic Panel  -     Thyroid Cascade  -     Thyroid Peroxidase Antibody    Adolescent idiopathic scoliosis of thoracolumbar region        There are no Patient Instructions on file for this visit.    No Follow-up on file.            CHIEF COMPLAINT: Dea Garcia had concerns including Concerns (DISCUSS SCOLIOSIS\"/\"DISCUSS GENLAB TEST\"); Immunizations (HEP-A/HPV); and Labs Only (\"FULL BLOOD PANEL\").    [place]: 1.............. had concerns including Concerns (DISCUSS SCOLIOSIS\"/\"DISCUSS GENLAB TEST\"); Immunizations (HEP-A/HPV); and Labs Only (\"FULL BLOOD PANEL\").    1. Vitamin D deficiency    2. Vasovagal Syncope    3. Vitamin B12 deficiency (non anemic)    4. Childhood Onset Pervasive Developmental Autistic Disorder Full Syndrome Present    5. Major depressive disorder with single episode, in remission (H)    6. Adolescent idiopathic scoliosis of thoracolumbar region          CC:             Why are you here today?                                HPV Declined,  Hep A  Too early    GeneSight CYP2CP  Best  Antidepressants   Pristiq,                                              ANxiolytics  Alprazolam, Buspar, Lorazepam,Temazepam                                              Tegretol, Lamictal, Trileptal  She is too early for vaccines  Comes in today with her mom  Has been having issues with scoliosis is currently seeing [facility]      Mom has a history of this as well        Any other Problems " in order of Priority:        SUBJECTIVE:  Dea Garcia is a 18 y.o. female    No past medical history on file.  No past surgical history on file.  Azithromycin; Influenza virus vaccine whole; and Red dye  Current Outpatient Medications   Medication Sig Dispense Refill     benzoyl peroxide 7 % Clsr Apply  Night to face and back 1 Bottle 11     cholecalciferol, vitamin D3, 400 unit/mL Drop drops Take 1 mL (400 Units total) by mouth daily. 50 mL 1     clindamycin (CLINDAGEL) 1 % gel Apply topically to face at bedtime. 60 g 2     hydrOXYzine (ATARAX) 10 mg/5 mL syrup Take 12.5 mL (25 mg total) by mouth every 8 (eight) hours as needed for anxiety. 473 mL 1     medroxyPROGESTERone (DEPO-PROVERA) 150 mg/mL injection Inject 1 mL (150 mg total) into the shoulder, thigh, or buttocks every 3 (three) months. 1 mL 0     pediatric multivit-iron-min (CEROVITE JR) Chew CHEW AND SWALLOW TWO TABLETS DAILY. 150 each 10     Current Facility-Administered Medications   Medication Dose Route Frequency Provider Last Rate Last Dose     medroxyPROGESTERone injection 150 mg (DEPO-PROVERA)  150 mg Intramuscular Q3 Months Alf Yeboah MD   150 mg at 10/23/18 1650     Family History   Problem Relation Age of Onset     Anxiety disorder Mother      Anxiety disorder Maternal Aunt      Social History     Socioeconomic History     Marital status: Single     Spouse name: None     Number of children: None     Years of education: None     Highest education level: None   Social Needs     Financial resource strain: None     Food insecurity - worry: None     Food insecurity - inability: None     Transportation needs - medical: None     Transportation needs - non-medical: None   Occupational History     None   Tobacco Use     Smoking status: Passive Smoke Exposure - Never Smoker     Smokeless tobacco: Never Used   Substance and Sexual Activity     Alcohol use: No     Drug use: No     Sexual activity: Not Currently   Other Topics Concern      "None   Social History Narrative     None     Patient Active Problem List   Diagnosis     Allergy To Pollens Weeds Ragweed     Acne     Childhood Onset Pervasive Developmental Autistic Disorder Full Syndrome Present     Tachycardia     Vasovagal Syncope     Dysfunctional Uterine Bleeding     Attention-deficit Hyperactivity Disorder     Major depressive disorder with single episode, in remission (H)     Vitamin B12 deficiency (non anemic)     Vitamin D deficiency                                              SOCIAL: She  reports that she is a non-smoker but has been exposed to tobacco smoke. she has never used smokeless tobacco. She reports that she does not drink alcohol or use drugs.    REVIEW OF SYSTEMS:   Family history not pertinent to chief complaint or presenting problem    Review of Systems:      Nervous System:  No headache, paresthesia or dizziness or fainting                                  Ears: No hearing loss or ringing in the ears    Eyes: No blurring of vision, Double Vision            No redness, itching or dryness.    Nose: No nosebleed or loss of smell    Mouth: No mouth sores or  coated tongue    Throat: No hoarseness or difficulty swallowing    Neck: No enlarged thyroid or lymph nodes.    Heart: No chest pain, palpitation or irregular heartbeat.                  Lungs: No shortness of breath, wheezing or hemoptysis.    Gastrointestinal: No nausea or vomiting, melena or blood in stools.    Kidney/Bladdr: No polyuria, polydipsia, or hematuria.                             Genital/Sexual: No Sex function Changes                                Skin: No rash    Muscles/Joints/Bones: No Muscle morning stiffness, No Effusion of a Joint     Review of systems otherwise negative as requested from patient, except   Those positive ROS outlined and discussed in [place].    OBJECTIVE:  /68 (Patient Site: Left Arm)   Pulse 68   Temp 98.5  F (36.9  C) (Oral)   Resp 13   Ht 5' 4.4\" (1.636 m)   Wt 133 lb " (60.3 kg)   BMI 22.55 kg/m      GENERAL:     No acute distress.   Alert and oriented X 3         Physical:    Right shoulder higher than left shoulder  Left scapula lower than the right scapula  Left iliac crest slightly higher than the right  Slight raised left thoracic cavity        ASSESSMENT & PLAN      Dea was seen today for concerns, immunizations and labs only.    Diagnoses and all orders for this visit:    Vitamin D deficiency  -     Vitamin D, Total (25-Hydroxy)    Vasovagal Syncope    Vitamin B12 deficiency (non anemic)  -     Vitamin B12    Childhood Onset Pervasive Developmental Autistic Disorder Full Syndrome Present    Major depressive disorder with single episode, in remission (H)  -     Comprehensive Metabolic Panel  -     Thyroid Cascade  -     Thyroid Peroxidase Antibody    Adolescent idiopathic scoliosis of thoracolumbar region        No Follow-up on file.       Anticipatory Guidance and Symptomatic Cares Discussed   Advised to call back directly if there are further questions, or if these symptoms fail to improve as anticipated or worsen.  Return to clinic if patient has a clinical concern that warrants an exam.         I spent 20 minutes with this patient face to face, of which 50% or greater was spent in counseling and coordination of care with regards to Dea was seen today for concerns, immunizations and labs only.    Diagnoses and all orders for this visit:    Vitamin D deficiency  -     Vitamin D, Total (25-Hydroxy)    Vasovagal Syncope    Vitamin B12 deficiency (non anemic)  -     Vitamin B12    Childhood Onset Pervasive Developmental Autistic Disorder Full Syndrome Present    Major depressive disorder with single episode, in remission (H)  -     Comprehensive Metabolic Panel  -     Thyroid Cascade  -     Thyroid Peroxidase Antibody    Adolescent idiopathic scoliosis of thoracolumbar region        Alf Yeboah MD  Family Medicine   [facility]  [place] [address]  [phone]

## 2021-06-23 NOTE — TELEPHONE ENCOUNTER
Fax received from [facility]    Prior Authorization request was approved for BP Wash 7%    Coverage Dates: 12/18/2018-1/17/2020    Case ID: [id number]    Approval fax was sent to medical records to be scanned to chart    Please contact the patient and pharmacy

## 2021-06-23 NOTE — TELEPHONE ENCOUNTER
Refill Approved    Rx renewed per Medication Renewal Policy. Medication was last renewed on 9/21/17.    Effie De La Paz, [facility] Triage/Med Refill 1/10/2019     Requested Prescriptions   Pending Prescriptions Disp Refills     benzoyl peroxide 7 % Clsr 1 Bottle 12     Sig: Apply  Night to face and back    Topical Dermatology Medications Refill Protocol Passed - 1/10/2019 10:11 AM       Passed - Patient has had office visit/physical in last 1 year    Last office visit with prescriber/PCP: 1/25/2018 Alf Yeboah MD OR same dept: 1/25/2018 Alf Yeboah MD OR same specialty: 1/25/2018 Alf Yeboah MD  Last physical: 10/23/2018 Last MTM visit: Visit date not found   Next visit within 3 mo: Visit date not found  Next physical within 3 mo: Visit date not found  Prescriber OR PCP: Alf Yeboah MD  Last diagnosis associated with med order: 1. Acne vulgaris  - benzoyl peroxide 7 % Clsr; Apply  Night to face and back  Dispense: 1 Bottle; Refill: 12    If protocol passes may refill for 12 months if within 3 months of last provider visit (or a total of 15 months).

## 2021-06-23 NOTE — PATIENT INSTRUCTIONS - HE
Blood work today  Genesight testing reviewed  We will have the patient continue to follow symptomatology and have her follow through with orthopedics on her consultation for scoliosis maintain good posture

## 2021-06-25 NOTE — TELEPHONE ENCOUNTER
RN cannot approve Refill Request    RN can NOT refill this medication med is not covered by policy/route to provider. Last office visit: 1/28/2019 Alf Yeboah MD Last Physical: 10/23/2018 Last MTM visit: Visit date not found Last visit same specialty: 1/28/2019 Alf Yeboah MD.  Next visit within 3 mo: Visit date not found  Next physical within 3 mo: Visit date not found      Andreia Corona, [facility] Triage/Med Refill 6/13/2021    Requested Prescriptions   Pending Prescriptions Disp Refills     medroxyPROGESTERone 150 mg/mL Syrg [Pharmacy Med Name: medroxyPROGESTERone Acetate Intramuscular Suspension Prefilled Syringe 150 MG/ML] 1 mL 0     Sig: INJECT 1 ML (150 MG TOTAL) INTO THE SHOULDER, THIGH, OR BUTTOCKS EVERY 3 (THREE) MONTHS       There is no refill protocol information for this order

## 2021-10-26 ENCOUNTER — OFFICE VISIT (OUTPATIENT)
Dept: URGENT CARE | Facility: URGENT CARE | Age: 21
End: 2021-10-26
Payer: COMMERCIAL

## 2021-10-26 VITALS
OXYGEN SATURATION: 98 % | SYSTOLIC BLOOD PRESSURE: 125 MMHG | TEMPERATURE: 98 F | HEART RATE: 78 BPM | DIASTOLIC BLOOD PRESSURE: 82 MMHG | RESPIRATION RATE: 20 BRPM

## 2021-10-26 DIAGNOSIS — R55 SYNCOPE, UNSPECIFIED SYNCOPE TYPE: Primary | ICD-10-CM

## 2021-10-26 DIAGNOSIS — R00.0 TACHYCARDIA: ICD-10-CM

## 2021-10-26 PROCEDURE — 99204 OFFICE O/P NEW MOD 45 MIN: CPT | Performed by: PHYSICIAN ASSISTANT

## 2021-10-26 NOTE — PATIENT INSTRUCTIONS
October 26, 2021 [facility]:       Due to your history of unexplained fainting today, followed by a heart rate of 189 beats per minutes and your past history of unknown heart problem at age 12 (you reportedly had lightheaded spells, a heart ultrasound and a heart monitor at age 12--with unknown results--I advise you allow your aunt to drive you directly to the ER now for further evaluation.

## 2021-10-26 NOTE — PROGRESS NOTES
"    ASSESSMENT/PLAN:    (R55) Syncope, unspecified syncope type  (primary encounter diagnosis)    MDM: Unexplained syncopal episode, and paramaic documented run of tachycardia in a 20 year old female with possible past history of heart arrhythmia (had work-up with monitor and cardiac US age 12 with unknown results) and grandmother with \"fast heart rate problems\" (no other history known). No tachycardia, chest pain or shortness of breath now. However, due to her above history and her tachycardia, I have advise aunt take her to ER now for further evaluation of tachycardia with syncopal event. Patient and aunt verbalize understanding of and agree to this plan.       Plan:     Below is reviewed with patient and aunt today, and provided in printed form today to hand carry to ER now. I have also advised the take paramedic rhythm strip to share with ER MD.     October 26, 2021 [facility]:       Due to your history of unexplained fainting today, followed by a heart rate of 189 beats per minutes and your past history of unknown heart problem at age 12 (you reportedly had lightheaded spells, a heart ultrasound and a heart monitor at age 12--with unknown results--I advise you allow your aunt to drive you directly to the ER now for further evaluation.         (R00.0) Tachycardia    ------------------------------------------------------    Kristinacitlali RAHUL Garcia is a 20 year old female, with a self reported history of prior cardiac work-up at age 12 with heart monitor and cardiac US (she does not know results) who presents to urgent care today, accompanied by her aunt for evaluation of unexplained fainting at approximately 2:30 today (3 hours ago).     HPI: Patient states she was hanging curtains with grandmother and suddenly became lightheaded and \"blacked out\" Grandmother and aunt were able to help her to ground. Aunt (who is with her now) states, \"I tried to slap her cheeks slightly to get her to wake up but she " "didn't, so I called 911.\"   Paramedics reportedly checked her glucose (aunt states they were told it was normal). The paramedics told her she had tachycardia and advised she go to UC or ER.  Patient brings rhythm strip that does show a run of tachycardia at 189 bpm.       Past Personal Cardiac History: Reports she was evaluated for near fainting at age 12. Patient states she had a heart monitor and cardiac ultrasound. She was reportedly advised to have a second heart monitor, but does not think that happened.     Family History: Aunt states Dea's grandmother has a problem with \"rapid heartbeats\" . No further information is known. No known history of sudden cardiac death below age 50.         ROS:   CONSTITUTIONAL: No acute onset fever,chills or severe weakenss  CARDIAC: Positive for transient tachycardia earlier today as per above. No sense of rapid or irregular heartbeats now. No acute onset chest pain or shortness of breath   RESP: No acute onset cough, chest pain or shortness of breath   GI: No acute onset abdominal pain. No acute onset nausea, vomiting or diarrhea   NEURO: No seizure activity associated with fainting. No acute onset headaches, neck stiffness or lethargy.   RHEUM: No associated acute onset red, warm or swollen joints  MUS/SKEL: No acute onset muscle weakness, numbness or tingling since fainting         OBJECTIVE:     /82   Pulse 78   Temp 98  F (36.7  C)   Resp 20   SpO2 98%      GENERAL:  Very pleasant, comfortable and generally well appearing.  SKIN: No rashes.  Normal color.  Sclera clear.  RESP: Normal - CTA without rales, rhonchi, or wheezing.  CARDIAC:NORMAL - regular rate and rhythm without murmur., normal s1/s2 and without extra heart sounds  ABDOMEN:  Soft, non-tender, non-distended.  Positive normal bowel sounds.  No HSM or masses.  No suprapubic tenderness.  No CVA tenderness.  NEURO: Alert and oriented.  Normal speech and mentation.  CN II/XII grossly intact.  Gait " within normal limits.       Patient brings rhythm strip that does show a run of tachycardia at 189 bpm.

## 2021-11-01 ENCOUNTER — VIRTUAL VISIT (OUTPATIENT)
Dept: FAMILY MEDICINE | Facility: CLINIC | Age: 21
End: 2021-11-01
Payer: COMMERCIAL

## 2021-11-01 DIAGNOSIS — R55 SYNCOPE, UNSPECIFIED SYNCOPE TYPE: Primary | ICD-10-CM

## 2021-11-01 DIAGNOSIS — R00.0 TACHYCARDIA: ICD-10-CM

## 2021-11-01 PROCEDURE — 99214 OFFICE O/P EST MOD 30 MIN: CPT | Mod: 95 | Performed by: FAMILY MEDICINE

## 2021-11-01 NOTE — ASSESSMENT & PLAN NOTE
"10/27/2021    Waas helping hang curtain   \"all of sudden went down\"  3 minutes out  Deepika cold and clammy  Out of it   911 >> paramedics and police  EKG on her 189 BPM and heart spiked     Urgent Care after     Plan   How now:  Fine   Did you know heart beating fast?  No  Dizziness?  Vision Changes >> color spots?  Yes  After   Vision got black     A/P    Syncope  Tachycardia            "

## 2021-11-01 NOTE — PROGRESS NOTES
"Telephone Visit     [id number]  No answer     [phone]               ASSESSMENT & PLAN    Syncope, unspecified syncope type  10/27/2021    Waas helping hang curtain   \"all of sudden went down\"  3 minutes out  Deepika cold and clammy  Out of it   911 >> paramedics and police  EKG on her 189 BPM and heart spiked     Urgent Care after     Plan   How now:  Fine   Did you know heart beating fast?  No  Dizziness?  Vision Changes >> color spots?  Yes  After   Vision got black     A/P    Syncope  Tachycardia                Dea was seen today for follow up.    Diagnoses and all orders for this visit:    Syncope, unspecified syncope type  -     CBC with platelets; Future  -     TSH with free T4 reflex; Future  -     Iron and iron binding capacity; Future  -     Ferritin; Future  -     Vitamin B12; Future  -     Comprehensive metabolic panel (BMP + Alb, Alk Phos, ALT, AST, Total. Bili, TP); Future  -     Magnesium; Future  -     Echocardiogram Complete; Future  -     Adult Cardiology Eval Referral; Future    Tachycardia  -     CBC with platelets; Future  -     TSH with free T4 reflex; Future  -     Iron and iron binding capacity; Future  -     Ferritin; Future  -     Vitamin B12; Future  -     Comprehensive metabolic panel (BMP + Alb, Alk Phos, ALT, AST, Total. Bili, TP); Future  -     Magnesium; Future  -     Echocardiogram Complete; Future  -     Adult Cardiology Eval Referral; Future        There are no Patient Instructions on file for this visit.    No follow-ups on file.       [unfilled]    CHIEF COMPLAINT: Dea Garcia had concerns including Follow Up (, passout).    [place]: 1.............. SUBJECTIVE:  Dea Garcia is a 20 year old female had concerns including Follow Up (, passout).    1. Syncope, unspecified syncope type    2. Tachycardia          Allergies   Allergen Reactions     Azithromycin Unknown     Influenza Virus Vaccine Whole [Influenza Vaccines] Rash     Red Dye Rash                "          SOCIAL: She  reports that she is a non-smoker but has been exposed to tobacco smoke. She has never used smokeless tobacco. She reports that she does not drink alcohol and does not use drugs.    REVIEW OF SYSTEMS:   Family history not pertinent to chief complaint or presenting problem    Review of systems otherwise negative as requested from patient, except   Those positive ROS outlined and discussed in [place].      VITALS:  There were no vitals filed for this visit.  Wt Readings from Last 3 Encounters:   01/28/19 60.3 kg (133 lb) (65 %, Z= 0.40)*   10/23/18 59.4 kg (131 lb) (63 %, Z= 0.34)*   01/25/18 59.9 kg (132 lb) (68 %, Z= 0.46)*     * Growth percentiles are based on [facility] (Girls, 2-20 Years) data.     There is no height or weight on file to calculate BMI.    Physical Exam:  None        I spent 13  minutes with this patient.  This includes pre-visit, intra-visit and post visit work an evaluation with regards to Dea was seen today for follow up.    Diagnoses and all orders for this visit:    Syncope, unspecified syncope type  -     CBC with platelets; Future  -     TSH with free T4 reflex; Future  -     Iron and iron binding capacity; Future  -     Ferritin; Future  -     Vitamin B12; Future  -     Comprehensive metabolic panel (BMP + Alb, Alk Phos, ALT, AST, Total. Bili, TP); Future  -     Magnesium; Future  -     Echocardiogram Complete; Future  -     Adult Cardiology Eval Referral; Future    Tachycardia  -     CBC with platelets; Future  -     TSH with free T4 reflex; Future  -     Iron and iron binding capacity; Future  -     Ferritin; Future  -     Vitamin B12; Future  -     Comprehensive metabolic panel (BMP + Alb, Alk Phos, ALT, AST, Total. Bili, TP); Future  -     Magnesium; Future  -     Echocardiogram Complete; Future  -     Adult Cardiology Eval Referral; Future        Alf Yeboah MD  [facility] [address] [phone]

## 2021-11-04 ENCOUNTER — HOSPITAL ENCOUNTER (OUTPATIENT)
Dept: CARDIOLOGY | Facility: CLINIC | Age: 21
Discharge: HOME | End: 2021-11-04
Attending: FAMILY MEDICINE | Admitting: FAMILY MEDICINE
Payer: COMMERCIAL

## 2021-11-04 DIAGNOSIS — R00.0 TACHYCARDIA: ICD-10-CM

## 2021-11-04 DIAGNOSIS — R55 SYNCOPE, UNSPECIFIED SYNCOPE TYPE: ICD-10-CM

## 2021-11-04 LAB — LVEF ECHO: NORMAL

## 2021-11-04 PROCEDURE — 93306 TTE W/DOPPLER COMPLETE: CPT | Mod: 26 | Performed by: INTERNAL MEDICINE

## 2021-11-04 PROCEDURE — 93306 TTE W/DOPPLER COMPLETE: CPT

## 2021-11-08 ENCOUNTER — TELEPHONE (OUTPATIENT)
Dept: FAMILY MEDICINE | Facility: CLINIC | Age: 21
End: 2021-11-08
Payer: COMMERCIAL

## 2021-11-08 NOTE — TELEPHONE ENCOUNTER
----- Message from Alf Yeboah MD sent at 11/6/2021 12:30 PM CDT -----  Please inform daily and that her echocardiogram is normal.Hedy

## 2021-12-08 ENCOUNTER — LAB (OUTPATIENT)
Dept: LAB | Facility: CLINIC | Age: 21
End: 2021-12-08
Payer: COMMERCIAL

## 2021-12-08 ENCOUNTER — OFFICE VISIT (OUTPATIENT)
Dept: CARDIOLOGY | Facility: CLINIC | Age: 21
End: 2021-12-08
Attending: FAMILY MEDICINE
Payer: COMMERCIAL

## 2021-12-08 VITALS
BODY MASS INDEX: 23.68 KG/M2 | DIASTOLIC BLOOD PRESSURE: 66 MMHG | SYSTOLIC BLOOD PRESSURE: 106 MMHG | HEART RATE: 104 BPM | OXYGEN SATURATION: 100 % | WEIGHT: 139.7 LBS

## 2021-12-08 DIAGNOSIS — R00.0 TACHYCARDIA: ICD-10-CM

## 2021-12-08 DIAGNOSIS — R55 SYNCOPE, UNSPECIFIED SYNCOPE TYPE: ICD-10-CM

## 2021-12-08 LAB
ERYTHROCYTE [DISTWIDTH] IN BLOOD BY AUTOMATED COUNT: 11.8 % (ref 10–15)
HCT VFR BLD AUTO: 42.7 % (ref 35–47)
HGB BLD-MCNC: 14.5 G/DL (ref 11.7–15.7)
MCH RBC QN AUTO: 29.2 PG (ref 26.5–33)
MCHC RBC AUTO-ENTMCNC: 34 G/DL (ref 31.5–36.5)
MCV RBC AUTO: 86 FL (ref 78–100)
PLATELET # BLD AUTO: 263 10E3/UL (ref 150–450)
RBC # BLD AUTO: 4.96 10E6/UL (ref 3.8–5.2)
VIT B12 SERPL-MCNC: 936 PG/ML (ref 193–986)
WBC # BLD AUTO: 6.4 10E3/UL (ref 4–11)

## 2021-12-08 PROCEDURE — 36415 COLL VENOUS BLD VENIPUNCTURE: CPT

## 2021-12-08 PROCEDURE — 93000 ELECTROCARDIOGRAM COMPLETE: CPT | Performed by: INTERNAL MEDICINE

## 2021-12-08 PROCEDURE — 82607 VITAMIN B-12: CPT

## 2021-12-08 PROCEDURE — 83550 IRON BINDING TEST: CPT

## 2021-12-08 PROCEDURE — 80053 COMPREHEN METABOLIC PANEL: CPT

## 2021-12-08 PROCEDURE — 85027 COMPLETE CBC AUTOMATED: CPT

## 2021-12-08 PROCEDURE — 84443 ASSAY THYROID STIM HORMONE: CPT

## 2021-12-08 PROCEDURE — 83735 ASSAY OF MAGNESIUM: CPT

## 2021-12-08 PROCEDURE — 99204 OFFICE O/P NEW MOD 45 MIN: CPT | Performed by: INTERNAL MEDICINE

## 2021-12-08 PROCEDURE — 82728 ASSAY OF FERRITIN: CPT

## 2021-12-08 NOTE — PROGRESS NOTES
HISTORY:    Dea Garcia is a pleasant 21-year-old female accompanied by her today.  She has a history of autism although presents fairly normally today.  She never graduated from high school and has never held a job.  She is seeing me today because of recurrent syncope.    Dea began experiencing near syncopal episodes at about 10 or 11 years of age when she started menstruating.  For many years she would pass out at least 2 or 3 times per month.  She had very heavy menstrual cycles twice monthly.  She was started on Depo-Provera at age 13 which completely stopped her syncopal spells until just recently.  Her aunt recalls the episode of lightheadedness in the summer but the patient does not remember that, she had a severe episode of near syncope in October while helping her her grandmother hang some curtains, while standing on a stepladder.  She had another mild episode this morning after having a blood draw.    Dea reports that her near syncopal episodes start with a sense of buzzing in her head followed by the room turning black and sound to be sounding like it is very distant.  She has never had complete syncope with loss of muscle tone but she has become unresponsive with no memory of her episodes.  She has never fallen or injured herself.  Her aunt has witnessed multiple episodes and reports that she appears pale and profoundly diaphoretic.  She cannot hear and her eyes open and close during these spells and she is unresponsive to noise or touch.  She never goes completely limp.  Lying down will terminate these episodes for her.  She states that she generally only gets a few seconds of warning before her symptoms peak.    At the episode in October 9 1 1 was summoned and apparently they found her to be tachycardic with a heart rate of 189, but arousable.  Glucose was normal.  She went to urgent care.  She was uninjured and was doing well and so was discharged with plans to see  cardiology.      ASSESSMENT/PLAN:    1.  Vasovagal near syncope.  This patient has had many years of vasovagal near syncope.  In the past this was more prominent when she had heavy and frequent menstrual cycles, now complete trolled with Depo-Provera.  She went many years without any episodes but has had 1 full-blown episode and a couple of mild episodes over the last 6 months.  This is very clearly vasovagal with diaphoresis lightheadedness paleness.  There have not been any identifiable triggers.  Cardiac exam is completely normal today.  ECG is normal.  The tachycardia she presented with was a physiologic response to stress and is normal.  I talked to her about the importance of increasing sodium intake and fluid intake as well as eating regular meals.  Turns out she sleeps till 5 in the evening and then stays awake until morning.  She tends to eat supper after she gets up and then tends to not eat anything the rest of her day.  I encouraged her to start eating regular meals.    I also taught Dea how to tense her muscles of her abdomen buttock and legs or use handgrip or harmful to increase her blood pressure.  Between her increased sodium and fluids and these maneuvers I am hoping we can keep her safe.  Would prefer not to initiate fludrocortisone or midodrine in this patient that has symptomatic spells but no syncope and has never injured herself.    Thank you for inviting me to participate in the care of your patient.  Please don't hesitate to call if I can be of further assistance.  >45 minutes were spent today reviewing the chart and other records, interviewing and examining the patient, and documenting our visit.    Chart documentation was completed, in part, with [employer] voice-recognition software. Even though reviewed, some grammatical, spelling, and word errors may remain.       Orders Placed This Encounter   Procedures     EKG 12-lead complete w/read - Clinics     No orders of the defined types  were placed in this encounter.    There are no discontinued medications.    10 year ASCVD risk: The ASCVD Risk score (Ayanmichael SANCHEZ Jr., et al., 2013) failed to calculate for the following reasons:    The 2013 ASCVD risk score is only valid for ages 40 to 79    Encounter Diagnoses   Name Primary?     Syncope, unspecified syncope type      Tachycardia        CURRENT MEDICATIONS:  Current Outpatient Medications   Medication Sig Dispense Refill     b complex vitamins tablet [B COMPLEX VITAMINS TABLET] Take 1 tablet by mouth daily. 30 tablet 12     benzoyl peroxide 7 % Clsr [BENZOYL PEROXIDE 7 % CLSR] Apply  Night to face and back 1 Bottle 11     cholecalciferol, vitamin D3, 400 unit/mL Drop drops [CHOLECALCIFEROL, VITAMIN D3, 400 UNIT/ML DROP DROPS] Take 2 mL (800 Units total) by mouth daily. 50 mL 3     medroxyPROGESTERone (DEPO-PROVERA) 150 mg/mL injection [MEDROXYPROGESTERONE (DEPO-PROVERA) 150 MG/ML INJECTION] Inject 1 mL (150 mg total) into the shoulder, thigh, or buttocks every 3 (three) months. 1 mL 3     clindamycin (CLINDAGEL) 1 % gel [CLINDAMYCIN (CLINDAGEL) 1 % GEL] Apply topically to face at bedtime. (Patient not taking: Reported on 12/8/2021) 60 g 2     docusate sodium (COLACE) 100 MG capsule [DOCUSATE SODIUM (COLACE) 100 MG CAPSULE] Take 2 capsules (200 mg total) by mouth daily. (Patient not taking: Reported on 12/8/2021) 60 capsule 2     fluconazole (DIFLUCAN) 10 mg/mL suspension [FLUCONAZOLE (DIFLUCAN) 10 MG/ML SUSPENSION] TAKE 10 ML (100 MG) BY MOUTH ONCE DAILY FOR 7 DAYS.   (Patient not taking: Reported on 12/8/2021) 70 mL 0     hydrOXYzine HCl (ATARAX) 10 mg/5 mL syrup [HYDROXYZINE HCL (ATARAX) 10 MG/5 ML SYRUP] Take 12.5 mL (25 mg total) by mouth every 8 (eight) hours as needed for anxiety. (Patient not taking: Reported on 12/8/2021) 473 mL 0     MEDICATION CANNOT BE REORDERED - PLEASE MANUALLY REORDER AND DISCONTINUE THE OLD ORDER [PEDI MULTIVIT 158-IRON-VIT K1 (CEROVITE JR) 18 MG IRON- 10 MCG CHEW] Chew  1 tablet daily. (Patient not taking: Reported on 12/8/2021) 150 tablet 3     medroxyPROGESTERone 150 mg/mL Syrg [MEDROXYPROGESTERONE 150 MG/ML SYRG] INJECT 1 ML (150 MG TOTAL) INTO THE SHOULDER, THIGH, OR BUTTOCKS EVERY 3 (THREE) MONTHS (Patient not taking: Reported on 12/8/2021) 1 mL 1     pediatric multivit-iron-min (CEROVITE JR) Chew [PEDIATRIC MULTIVIT-IRON-MIN (CEROVITE JR) CHEW] CHEW AND SWALLOW TWO TABLETS DAILY. (Patient not taking: Reported on 12/8/2021) 150 each 10       ALLERGIES     Allergies   Allergen Reactions     Azithromycin Unknown     Influenza Virus Vaccine Whole [Influenza Vaccines] Rash     Red Dye Rash       PAST MEDICAL HISTORY:  History reviewed. No pertinent past medical history.    PAST SURGICAL HISTORY:  History reviewed. No pertinent surgical history.    FAMILY HISTORY:  Family History   Problem Relation Age of Onset     Anxiety Disorder Mother      Anxiety Disorder Maternal Aunt        SOCIAL HISTORY:  Social History     Socioeconomic History     Marital status: Single     Spouse name: None     Number of children: None     Years of education: None     Highest education level: None   Occupational History     None   Tobacco Use     Smoking status: Passive Smoke Exposure - Never Smoker     Smokeless tobacco: Never Used   Substance and Sexual Activity     Alcohol use: No     Drug use: No     Sexual activity: Not Currently   Other Topics Concern     None   Social History Narrative     None     Social Determinants of Health     Financial Resource Strain: Not on file   Food Insecurity: Not on file   Transportation Needs: Not on file   Physical Activity: Not on file   Stress: Not on file   Social Connections: Not on file   Intimate Partner Violence: Not on file   Housing Stability: Not on file       Review of Systems:  Skin:  Negative     Eyes:  Positive for glasses  ENT:  Negative    Respiratory:  Positive for shortness of breath  Cardiovascular:    Positive for;syncope or  near-syncope;lightheadedness;dizziness;fatigue  Gastroenterology: Negative    Genitourinary:  Negative    Musculoskeletal:  Negative    Neurologic:  Negative    Psychiatric:  Positive for anxiety  Heme/Lymph/Imm:  Negative    Endocrine:  Negative      Physical Exam:  Vitals: /66 (BP Location: Right arm, Patient Position: Chair, Cuff Size: Adult Regular)   Pulse 104   Wt 63.4 kg (139 lb 11.2 oz)   SpO2 100%   BMI 23.68 kg/m      Constitutional:  cooperative, alert and oriented, well developed, well nourished, in no acute distress        Skin:  warm and dry to the touch, no apparent skin lesions or masses noted        Head:  normocephalic, no masses or lesions        Eyes:  pupils equal and round, conjunctivae and lids unremarkable, sclera white, no xanthalasma, EOMS intact, no nystagmus        ENT:  no pallor or cyanosis   masked    Neck:  carotid pulses are full and equal bilaterally, JVP normal, no carotid bruit        Chest:  normal breath sounds, clear to auscultation, normal A-P diameter, normal symmetry, normal respiratory excursion, no use of accessory muscles        Cardiac: regular rhythm, normal S1/S2, no S3 or S4, apical impulse not displaced, no murmurs, gallops or rubs                  Abdomen:  abdomen soft;BS normoactive;non-tender        Vascular: pulses full and equal                                      Extremities and Muscular Skeletal:  no edema           Neurological:  no gross motor deficits        Psych:  affect appropriate, oriented to time, person and place     Recent Lab Results:  LIPID RESULTS:  No results found for: CHOL, HDL, LDL, TRIG, CHOLHDLRATIO    LIVER ENZYME RESULTS:  Lab Results   Component Value Date    AST 15 01/28/2019    ALT 13 01/28/2019       CBC RESULTS:  Lab Results   Component Value Date    WBC 6.4 12/08/2021    RBC 4.96 12/08/2021    HGB 14.5 12/08/2021    HCT 42.7 12/08/2021    MCV 86 12/08/2021    MCH 29.2 12/08/2021    MCHC 34.0 12/08/2021    RDW 11.8  12/08/2021     12/08/2021       BMP RESULTS:  Lab Results   Component Value Date     01/28/2019    POTASSIUM 3.7 01/28/2019    CHLORIDE 108 (H) 01/28/2019    CO2 20 (L) 01/28/2019    ANIONGAP 13 01/28/2019    GLC 84 01/28/2019    BUN 7 (L) 01/28/2019    CR 0.80 01/28/2019    GFRESTIMATED >60 01/28/2019    GFRESTBLACK >60 01/28/2019    PAULO 10.4 01/28/2019        A1C RESULTS:  No results found for: A1C    INR RESULTS:  No results found for: INR      Asa Barr MD, [facility]    CC  Alf Yeboah MD  [address] [place] [address]

## 2021-12-08 NOTE — LETTER
12/8/2021    Alf Yeboah MD  [address] [place] [address]    RE: Dea Callejasjavedmamadou       Dear Colleague,     I had the pleasure of seeing Dea Garcia in the [facility].  HISTORY:    Dea Garcia is a pleasant 21-year-old female accompanied by her today.  She has a history of autism although presents fairly normally today.  She never graduated from high school and has never held a job.  She is seeing me today because of recurrent syncope.    Dea began experiencing near syncopal episodes at about 10 or 11 years of age when she started menstruating.  For many years she would pass out at least 2 or 3 times per month.  She had very heavy menstrual cycles twice monthly.  She was started on Depo-Provera at age 13 which completely stopped her syncopal spells until just recently.  Her aunt recalls the episode of lightheadedness in the summer but the patient does not remember that, she had a severe episode of near syncope in October while helping her her grandmother hang some curtains, while standing on a stepladder.  She had another mild episode this morning after having a blood draw.    Dea reports that her near syncopal episodes start with a sense of buzzing in her head followed by the room turning black and sound to be sounding like it is very distant.  She has never had complete syncope with loss of muscle tone but she has become unresponsive with no memory of her episodes.  She has never fallen or injured herself.  Her aunt has witnessed multiple episodes and reports that she appears pale and profoundly diaphoretic.  She cannot hear and her eyes open and close during these spells and she is unresponsive to noise or touch.  She never goes completely limp.  Lying down will terminate these episodes for her.  She states that she generally only gets a few seconds of warning before her symptoms peak.    At the episode in October 9 1 1 was summoned  and apparently they found her to be tachycardic with a heart rate of 189, but arousable.  Glucose was normal.  She went to urgent care.  She was uninjured and was doing well and so was discharged with plans to see cardiology.      ASSESSMENT/PLAN:    1.  Vasovagal near syncope.  This patient has had many years of vasovagal near syncope.  In the past this was more prominent when she had heavy and frequent menstrual cycles, now complete trolled with Depo-Provera.  She went many years without any episodes but has had 1 full-blown episode and a couple of mild episodes over the last 6 months.  This is very clearly vasovagal with diaphoresis lightheadedness paleness.  There have not been any identifiable triggers.  Cardiac exam is completely normal today.  ECG is normal.  The tachycardia she presented with was a physiologic response to stress and is normal.  I talked to her about the importance of increasing sodium intake and fluid intake as well as eating regular meals.  Turns out she sleeps till 5 in the evening and then stays awake until morning.  She tends to eat supper after she gets up and then tends to not eat anything the rest of her day.  I encouraged her to start eating regular meals.    I also taught Dea how to tense her muscles of her abdomen buttock and legs or use handgrip or harmful to increase her blood pressure.  Between her increased sodium and fluids and these maneuvers I am hoping we can keep her safe.  Would prefer not to initiate fludrocortisone or midodrine in this patient that has symptomatic spells but no syncope and has never injured herself.    Thank you for inviting me to participate in the care of your patient.  Please don't hesitate to call if I can be of further assistance.  >45 minutes were spent today reviewing the chart and other records, interviewing and examining the patient, and documenting our visit.    Chart documentation was completed, in part, with [employer] voice-recognition  software. Even though reviewed, some grammatical, spelling, and word errors may remain.       Orders Placed This Encounter   Procedures     EKG 12-lead complete w/read - Clinics     No orders of the defined types were placed in this encounter.    There are no discontinued medications.    10 year ASCVD risk: The ASCVD Risk score (Ayan SANCHEZ Jr., et al., 2013) failed to calculate for the following reasons:    The 2013 ASCVD risk score is only valid for ages 40 to 79    Encounter Diagnoses   Name Primary?     Syncope, unspecified syncope type      Tachycardia        CURRENT MEDICATIONS:  Current Outpatient Medications   Medication Sig Dispense Refill     b complex vitamins tablet [B COMPLEX VITAMINS TABLET] Take 1 tablet by mouth daily. 30 tablet 12     benzoyl peroxide 7 % Clsr [BENZOYL PEROXIDE 7 % CLSR] Apply  Night to face and back 1 Bottle 11     cholecalciferol, vitamin D3, 400 unit/mL Drop drops [CHOLECALCIFEROL, VITAMIN D3, 400 UNIT/ML DROP DROPS] Take 2 mL (800 Units total) by mouth daily. 50 mL 3     medroxyPROGESTERone (DEPO-PROVERA) 150 mg/mL injection [MEDROXYPROGESTERONE (DEPO-PROVERA) 150 MG/ML INJECTION] Inject 1 mL (150 mg total) into the shoulder, thigh, or buttocks every 3 (three) months. 1 mL 3     clindamycin (CLINDAGEL) 1 % gel [CLINDAMYCIN (CLINDAGEL) 1 % GEL] Apply topically to face at bedtime. (Patient not taking: Reported on 12/8/2021) 60 g 2     docusate sodium (COLACE) 100 MG capsule [DOCUSATE SODIUM (COLACE) 100 MG CAPSULE] Take 2 capsules (200 mg total) by mouth daily. (Patient not taking: Reported on 12/8/2021) 60 capsule 2     fluconazole (DIFLUCAN) 10 mg/mL suspension [FLUCONAZOLE (DIFLUCAN) 10 MG/ML SUSPENSION] TAKE 10 ML (100 MG) BY MOUTH ONCE DAILY FOR 7 DAYS.   (Patient not taking: Reported on 12/8/2021) 70 mL 0     hydrOXYzine HCl (ATARAX) 10 mg/5 mL syrup [HYDROXYZINE HCL (ATARAX) 10 MG/5 ML SYRUP] Take 12.5 mL (25 mg total) by mouth every 8 (eight) hours as needed for anxiety.  (Patient not taking: Reported on 12/8/2021) 473 mL 0     MEDICATION CANNOT BE REORDERED - PLEASE MANUALLY REORDER AND DISCONTINUE THE OLD ORDER [PEDI MULTIVIT 158-IRON-VIT K1 (CEROVITE JR) 18 MG IRON- 10 MCG CHEW] Chew 1 tablet daily. (Patient not taking: Reported on 12/8/2021) 150 tablet 3     medroxyPROGESTERone 150 mg/mL Syrg [MEDROXYPROGESTERONE 150 MG/ML SYRG] INJECT 1 ML (150 MG TOTAL) INTO THE SHOULDER, THIGH, OR BUTTOCKS EVERY 3 (THREE) MONTHS (Patient not taking: Reported on 12/8/2021) 1 mL 1     pediatric multivit-iron-min (CEROVITE JR) Chew [PEDIATRIC MULTIVIT-IRON-MIN (CEROVITE JR) CHEW] CHEW AND SWALLOW TWO TABLETS DAILY. (Patient not taking: Reported on 12/8/2021) 150 each 10       ALLERGIES     Allergies   Allergen Reactions     Azithromycin Unknown     Influenza Virus Vaccine Whole [Influenza Vaccines] Rash     Red Dye Rash       PAST MEDICAL HISTORY:  History reviewed. No pertinent past medical history.    PAST SURGICAL HISTORY:  History reviewed. No pertinent surgical history.    FAMILY HISTORY:  Family History   Problem Relation Age of Onset     Anxiety Disorder Mother      Anxiety Disorder Maternal Aunt        SOCIAL HISTORY:  Social History     Socioeconomic History     Marital status: Single     Spouse name: None     Number of children: None     Years of education: None     Highest education level: None   Occupational History     None   Tobacco Use     Smoking status: Passive Smoke Exposure - Never Smoker     Smokeless tobacco: Never Used   Substance and Sexual Activity     Alcohol use: No     Drug use: No     Sexual activity: Not Currently   Other Topics Concern     None   Social History Narrative     None     Social Determinants of Health     Financial Resource Strain: Not on file   Food Insecurity: Not on file   Transportation Needs: Not on file   Physical Activity: Not on file   Stress: Not on file   Social Connections: Not on file   Intimate Partner Violence: Not on file   Housing  Stability: Not on file       Review of Systems:  Skin:  Negative     Eyes:  Positive for glasses  ENT:  Negative    Respiratory:  Positive for shortness of breath  Cardiovascular:    Positive for;syncope or near-syncope;lightheadedness;dizziness;fatigue  Gastroenterology: Negative    Genitourinary:  Negative    Musculoskeletal:  Negative    Neurologic:  Negative    Psychiatric:  Positive for anxiety  Heme/Lymph/Imm:  Negative    Endocrine:  Negative      Physical Exam:  Vitals: /66 (BP Location: Right arm, Patient Position: Chair, Cuff Size: Adult Regular)   Pulse 104   Wt 63.4 kg (139 lb 11.2 oz)   SpO2 100%   BMI 23.68 kg/m      Constitutional:  cooperative, alert and oriented, well developed, well nourished, in no acute distress        Skin:  warm and dry to the touch, no apparent skin lesions or masses noted        Head:  normocephalic, no masses or lesions        Eyes:  pupils equal and round, conjunctivae and lids unremarkable, sclera white, no xanthalasma, EOMS intact, no nystagmus        ENT:  no pallor or cyanosis   masked    Neck:  carotid pulses are full and equal bilaterally, JVP normal, no carotid bruit        Chest:  normal breath sounds, clear to auscultation, normal A-P diameter, normal symmetry, normal respiratory excursion, no use of accessory muscles        Cardiac: regular rhythm, normal S1/S2, no S3 or S4, apical impulse not displaced, no murmurs, gallops or rubs                  Abdomen:  abdomen soft;BS normoactive;non-tender        Vascular: pulses full and equal                                      Extremities and Muscular Skeletal:  no edema           Neurological:  no gross motor deficits        Psych:  affect appropriate, oriented to time, person and place     Recent Lab Results:  LIPID RESULTS:  No results found for: CHOL, HDL, LDL, TRIG, CHOLHDLRATIO    LIVER ENZYME RESULTS:  Lab Results   Component Value Date    AST 15 01/28/2019    ALT 13 01/28/2019       CBC RESULTS:  Lab  Results   Component Value Date    WBC 6.4 12/08/2021    RBC 4.96 12/08/2021    HGB 14.5 12/08/2021    HCT 42.7 12/08/2021    MCV 86 12/08/2021    MCH 29.2 12/08/2021    MCHC 34.0 12/08/2021    RDW 11.8 12/08/2021     12/08/2021       BMP RESULTS:  Lab Results   Component Value Date     01/28/2019    POTASSIUM 3.7 01/28/2019    CHLORIDE 108 (H) 01/28/2019    CO2 20 (L) 01/28/2019    ANIONGAP 13 01/28/2019    GLC 84 01/28/2019    BUN 7 (L) 01/28/2019    CR 0.80 01/28/2019    GFRESTIMATED >60 01/28/2019    GFRESTBLACK >60 01/28/2019    PAULO 10.4 01/28/2019        A1C RESULTS:  No results found for: A1C    INR RESULTS:  No results found for: INR      Asa Barr MD, FACC      CC  Alf Yeboah MD  [address] [place] [address]

## 2021-12-09 LAB
ALBUMIN SERPL-MCNC: 3.7 G/DL (ref 3.4–5)
ALP SERPL-CCNC: 55 U/L (ref 40–150)
ALT SERPL W P-5'-P-CCNC: 15 U/L (ref 0–50)
ANION GAP SERPL CALCULATED.3IONS-SCNC: 7 MMOL/L (ref 3–14)
AST SERPL W P-5'-P-CCNC: 8 U/L (ref 0–45)
BILIRUB SERPL-MCNC: 0.6 MG/DL (ref 0.2–1.3)
BUN SERPL-MCNC: 12 MG/DL (ref 7–30)
CALCIUM SERPL-MCNC: 9.3 MG/DL (ref 8.5–10.1)
CHLORIDE BLD-SCNC: 108 MMOL/L (ref 94–109)
CO2 SERPL-SCNC: 24 MMOL/L (ref 20–32)
CREAT SERPL-MCNC: 0.84 MG/DL (ref 0.52–1.04)
FERRITIN SERPL-MCNC: 48 NG/ML (ref 12–150)
GFR SERPL CREATININE-BSD FRML MDRD: >90 ML/MIN/1.73M2
GLUCOSE BLD-MCNC: 86 MG/DL (ref 70–99)
IRON SATN MFR SERPL: 31 % (ref 15–46)
IRON SERPL-MCNC: 76 UG/DL (ref 35–180)
MAGNESIUM SERPL-MCNC: 2.2 MG/DL (ref 1.6–2.3)
POTASSIUM BLD-SCNC: 3.4 MMOL/L (ref 3.4–5.3)
PROT SERPL-MCNC: 7.8 G/DL (ref 6.8–8.8)
SODIUM SERPL-SCNC: 139 MMOL/L (ref 133–144)
TIBC SERPL-MCNC: 248 UG/DL (ref 240–430)
TSH SERPL DL<=0.005 MIU/L-ACNC: 3.02 MU/L (ref 0.4–4)

## 2021-12-21 DIAGNOSIS — Z30.9 ENCOUNTER FOR CONTRACEPTIVE MANAGEMENT, UNSPECIFIED TYPE: ICD-10-CM

## 2021-12-21 RX ORDER — MEDROXYPROGESTERONE ACETATE 150 MG/ML
150 INJECTION, SUSPENSION INTRAMUSCULAR
Qty: 1 ML | Refills: 3 | Status: SHIPPED | OUTPATIENT
Start: 2021-12-21 | End: 2022-10-12

## 2021-12-21 NOTE — TELEPHONE ENCOUNTER
Reason for Call:  Medication or medication refill:    Do you use a [facility]?  Name of the pharmacy and phone number for the current request:  [facility]    Name of the medication requested: Pt is stating she gives her depo shot and she give it to herself.    She is needing this sent to pharm    Other request: didn't realize she needed refilled.      Can we leave a detailed message on this number? YES    Phone number patient can be reached at: Cell number on file:    Telephone Information:   Mobile [phone]       Best Time: any    Call taken on 12/21/2021 at 2:20 PM by Pam J. Behr

## 2022-10-12 ENCOUNTER — ANCILLARY PROCEDURE (OUTPATIENT)
Dept: GENERAL RADIOLOGY | Facility: CLINIC | Age: 22
End: 2022-10-12
Attending: PEDIATRICS
Payer: COMMERCIAL

## 2022-10-12 ENCOUNTER — OFFICE VISIT (OUTPATIENT)
Dept: PEDIATRICS | Facility: CLINIC | Age: 22
End: 2022-10-12
Payer: COMMERCIAL

## 2022-10-12 VITALS
BODY MASS INDEX: 24.33 KG/M2 | WEIGHT: 142.5 LBS | TEMPERATURE: 98.3 F | HEART RATE: 101 BPM | HEIGHT: 64 IN | OXYGEN SATURATION: 99 % | SYSTOLIC BLOOD PRESSURE: 105 MMHG | DIASTOLIC BLOOD PRESSURE: 63 MMHG

## 2022-10-12 DIAGNOSIS — Z11.59 NEED FOR HEPATITIS C SCREENING TEST: ICD-10-CM

## 2022-10-12 DIAGNOSIS — Z87.39 HISTORY OF SCOLIOSIS: ICD-10-CM

## 2022-10-12 DIAGNOSIS — Z00.00 ROUTINE GENERAL MEDICAL EXAMINATION AT A HEALTH CARE FACILITY: Primary | ICD-10-CM

## 2022-10-12 DIAGNOSIS — F32.5 MAJOR DEPRESSIVE DISORDER WITH SINGLE EPISODE, IN REMISSION (H): ICD-10-CM

## 2022-10-12 DIAGNOSIS — Z11.3 SCREENING FOR STDS (SEXUALLY TRANSMITTED DISEASES): ICD-10-CM

## 2022-10-12 DIAGNOSIS — F90.9 ATTENTION DEFICIT HYPERACTIVITY DISORDER (ADHD), UNSPECIFIED ADHD TYPE: ICD-10-CM

## 2022-10-12 DIAGNOSIS — F84.0 ACTIVE AUTISTIC DISORDER: ICD-10-CM

## 2022-10-12 DIAGNOSIS — Z12.4 CERVICAL CANCER SCREENING: ICD-10-CM

## 2022-10-12 DIAGNOSIS — Z11.4 SCREENING FOR HIV (HUMAN IMMUNODEFICIENCY VIRUS): ICD-10-CM

## 2022-10-12 DIAGNOSIS — J30.0 VASOMOTOR RHINITIS: ICD-10-CM

## 2022-10-12 DIAGNOSIS — Z30.9 ENCOUNTER FOR CONTRACEPTIVE MANAGEMENT, UNSPECIFIED TYPE: ICD-10-CM

## 2022-10-12 PROCEDURE — 72070 X-RAY EXAM THORAC SPINE 2VWS: CPT | Mod: TC | Performed by: RADIOLOGY

## 2022-10-12 PROCEDURE — 99214 OFFICE O/P EST MOD 30 MIN: CPT | Mod: GC | Performed by: STUDENT IN AN ORGANIZED HEALTH CARE EDUCATION/TRAINING PROGRAM

## 2022-10-12 PROCEDURE — 72040 X-RAY EXAM NECK SPINE 2-3 VW: CPT | Mod: TC | Performed by: RADIOLOGY

## 2022-10-12 PROCEDURE — 96127 BRIEF EMOTIONAL/BEHAV ASSMT: CPT | Performed by: STUDENT IN AN ORGANIZED HEALTH CARE EDUCATION/TRAINING PROGRAM

## 2022-10-12 PROCEDURE — 72100 X-RAY EXAM L-S SPINE 2/3 VWS: CPT | Mod: TC | Performed by: RADIOLOGY

## 2022-10-12 PROCEDURE — 99395 PREV VISIT EST AGE 18-39: CPT | Mod: GC | Performed by: STUDENT IN AN ORGANIZED HEALTH CARE EDUCATION/TRAINING PROGRAM

## 2022-10-12 RX ORDER — MEDROXYPROGESTERONE ACETATE 150 MG/ML
150 INJECTION, SUSPENSION INTRAMUSCULAR
Qty: 1 ML | Refills: 3 | OUTPATIENT
Start: 2022-10-12 | End: 2022-12-20

## 2022-10-12 RX ORDER — CETIRIZINE HYDROCHLORIDE 10 MG/1
10 TABLET ORAL DAILY
Qty: 90 TABLET | Refills: 3 | Status: SHIPPED | OUTPATIENT
Start: 2022-10-12 | End: 2023-11-13

## 2022-10-12 RX ORDER — SERTRALINE HYDROCHLORIDE 25 MG/1
25 TABLET, FILM COATED ORAL DAILY
Qty: 90 TABLET | Refills: 0 | Status: SHIPPED | OUTPATIENT
Start: 2022-10-12 | End: 2022-11-10

## 2022-10-12 SDOH — ECONOMIC STABILITY: INCOME INSECURITY: HOW HARD IS IT FOR YOU TO PAY FOR THE VERY BASICS LIKE FOOD, HOUSING, MEDICAL CARE, AND HEATING?: NOT VERY HARD

## 2022-10-12 SDOH — HEALTH STABILITY: PHYSICAL HEALTH: ON AVERAGE, HOW MANY MINUTES DO YOU ENGAGE IN EXERCISE AT THIS LEVEL?: 20 MIN

## 2022-10-12 SDOH — ECONOMIC STABILITY: FOOD INSECURITY: WITHIN THE PAST 12 MONTHS, THE FOOD YOU BOUGHT JUST DIDN'T LAST AND YOU DIDN'T HAVE MONEY TO GET MORE.: NEVER TRUE

## 2022-10-12 SDOH — ECONOMIC STABILITY: INCOME INSECURITY: IN THE LAST 12 MONTHS, WAS THERE A TIME WHEN YOU WERE NOT ABLE TO PAY THE MORTGAGE OR RENT ON TIME?: PATIENT REFUSED

## 2022-10-12 SDOH — ECONOMIC STABILITY: TRANSPORTATION INSECURITY
IN THE PAST 12 MONTHS, HAS THE LACK OF TRANSPORTATION KEPT YOU FROM MEDICAL APPOINTMENTS OR FROM GETTING MEDICATIONS?: PATIENT DECLINED

## 2022-10-12 SDOH — HEALTH STABILITY: PHYSICAL HEALTH: ON AVERAGE, HOW MANY DAYS PER WEEK DO YOU ENGAGE IN MODERATE TO STRENUOUS EXERCISE (LIKE A BRISK WALK)?: 6 DAYS

## 2022-10-12 SDOH — ECONOMIC STABILITY: TRANSPORTATION INSECURITY
IN THE PAST 12 MONTHS, HAS LACK OF TRANSPORTATION KEPT YOU FROM MEETINGS, WORK, OR FROM GETTING THINGS NEEDED FOR DAILY LIVING?: PATIENT DECLINED

## 2022-10-12 SDOH — ECONOMIC STABILITY: FOOD INSECURITY: WITHIN THE PAST 12 MONTHS, YOU WORRIED THAT YOUR FOOD WOULD RUN OUT BEFORE YOU GOT MONEY TO BUY MORE.: NEVER TRUE

## 2022-10-12 ASSESSMENT — ENCOUNTER SYMPTOMS
DIARRHEA: 0
CHILLS: 0
PARESTHESIAS: 0
PALPITATIONS: 0
FREQUENCY: 0
JOINT SWELLING: 0
CONSTIPATION: 0
HEADACHES: 0
COUGH: 0
DIZZINESS: 0
EYE PAIN: 0
HEARTBURN: 0
HEMATOCHEZIA: 0
WEAKNESS: 0
BREAST MASS: 0
SORE THROAT: 0
ARTHRALGIAS: 0
NAUSEA: 0
ABDOMINAL PAIN: 0
FEVER: 0
DYSURIA: 0
HEMATURIA: 0
MYALGIAS: 0
NERVOUS/ANXIOUS: 0
SHORTNESS OF BREATH: 0

## 2022-10-12 ASSESSMENT — LIFESTYLE VARIABLES
HOW OFTEN DO YOU HAVE A DRINK CONTAINING ALCOHOL: MONTHLY OR LESS
HOW OFTEN DO YOU HAVE SIX OR MORE DRINKS ON ONE OCCASION: NEVER
SKIP TO QUESTIONS 9-10: 1
HOW MANY STANDARD DRINKS CONTAINING ALCOHOL DO YOU HAVE ON A TYPICAL DAY: 1 OR 2
AUDIT-C TOTAL SCORE: 1

## 2022-10-12 ASSESSMENT — PATIENT HEALTH QUESTIONNAIRE - PHQ9
10. IF YOU CHECKED OFF ANY PROBLEMS, HOW DIFFICULT HAVE THESE PROBLEMS MADE IT FOR YOU TO DO YOUR WORK, TAKE CARE OF THINGS AT HOME, OR GET ALONG WITH OTHER PEOPLE: NOT DIFFICULT AT ALL
SUM OF ALL RESPONSES TO PHQ QUESTIONS 1-9: 4
SUM OF ALL RESPONSES TO PHQ QUESTIONS 1-9: 4

## 2022-10-12 ASSESSMENT — SOCIAL DETERMINANTS OF HEALTH (SDOH)
IN A TYPICAL WEEK, HOW MANY TIMES DO YOU TALK ON THE PHONE WITH FAMILY, FRIENDS, OR NEIGHBORS?: TWICE A WEEK
HOW OFTEN DO YOU GET TOGETHER WITH FRIENDS OR RELATIVES?: MORE THAN THREE TIMES A WEEK
DO YOU BELONG TO ANY CLUBS OR ORGANIZATIONS SUCH AS CHURCH GROUPS UNIONS, FRATERNAL OR ATHLETIC GROUPS, OR SCHOOL GROUPS?: PATIENT DECLINED
HOW OFTEN DO YOU ATTEND CHURCH OR RELIGIOUS SERVICES?: NEVER
ARE YOU MARRIED, WIDOWED, DIVORCED, SEPARATED, NEVER MARRIED, OR LIVING WITH A PARTNER?: NEVER MARRIED

## 2022-10-12 ASSESSMENT — PAIN SCALES - GENERAL: PAINLEVEL: NO PAIN (0)

## 2022-10-12 NOTE — PROGRESS NOTES
SUBJECTIVE:   CC: Dea is an 21 year old who presents for preventive health visit.     Patient has been advised of split billing requirements and indicates understanding: Yes  Healthy Habits:     Getting at least 3 servings of Calcium per day:  Yes    Bi-annual eye exam:  Yes    Dental care twice a year:  NO    Sleep apnea or symptoms of sleep apnea:  None    Diet:  Regular (no restrictions) and Breakfast skipped    Frequency of exercise:  4-5 days/week    Duration of exercise:  45-60 minutes    Taking medications regularly:  Yes    Medication side effects:  Not applicable    PHQ-2 Total Score: 2    Additional concerns today:  No    Allergies:    Significant congestion history. Has previously tried benadryl intermittently with some effect. Looking for additional OTC therapy.     ASD:   Cognitively disabled due to this. Grandmother is in the process of obtaining guardianship after mother recently passed away in March.     Anxiety/MDD:  Significant anxiety symptoms. Particularly with large crowds and social spaces. Has not tried medication for this but is wondering about starting something that will help. Additionally interested in therapy.     History of heavy menstrual bleeding:   Depot shots have improved it signficantly.     Hx of dental infection and poor dental care:  No recent history abscesses or tooth pain that patient reports today. Has not been to see dentists for a long time. Has had history of dental abscesses requiring antibiotic therapy. They are working to get her into a dentists, however, very difficulty to find one that will accept her insurance.     Today's PHQ-2 Score:   PHQ-2 ( 1999 Pfizer) 10/12/2022   Q1: Little interest or pleasure in doing things 1   Q2: Feeling down, depressed or hopeless 1   PHQ-2 Score 2   Q1: Little interest or pleasure in doing things Several days   Q2: Feeling down, depressed or hopeless Several days   PHQ-2 Score 2     Abuse: Current or Past (Physical, Sexual or  Emotional) - No  Do you feel safe in your environment? Yes    Social History     Tobacco Use     Smoking status: Never     Passive exposure: Yes     Smokeless tobacco: Never   Substance Use Topics     Alcohol use: No   No recreational drug use  Never sexually active.     If you drink alcohol do you typically have >3 drinks per day or >7 drinks per week? No    Alcohol Use 10/12/2022   Prescreen: >3 drinks/day or >7 drinks/week? Not Applicable   Prescreen: >3 drinks/day or >7 drinks/week? -     Reviewed orders with patient.  Reviewed health maintenance and updated orders accordingly - Yes    Breast Cancer Screening: PGGM 66  No family history of colon cancer   FH: Depression, anxiety, DM, Mother passed away from pulmonary emboli (underlying blood clotting disorder), Aunt with a DVT.     SH: Never sexually active     History of abnormal Pap smear: NO - age 21-29 PAP every 3 years recommended, will discuss with GM after guardianship papers obtained.       Reviewed and updated as needed this visit by clinical staff   Tobacco  Allergies    Med Hx  Surg Hx  Fam Hx  Soc Hx        Reviewed and updated as needed this visit by Provider    Review of Systems   Constitutional: Negative for chills and fever.   HENT: Negative for congestion, ear pain, hearing loss and sore throat.    Eyes: Negative for pain and visual disturbance.   Respiratory: Negative for cough and shortness of breath.    Cardiovascular: Negative for chest pain, palpitations and peripheral edema.   Gastrointestinal: Negative for abdominal pain, constipation, diarrhea, heartburn, hematochezia and nausea.   Breasts:  Negative for tenderness, breast mass and discharge.   Genitourinary: Negative for dysuria, frequency, genital sores, hematuria, pelvic pain, urgency, vaginal bleeding and vaginal discharge.   Musculoskeletal: Negative for arthralgias, joint swelling and myalgias.   Skin: Negative for rash.   Neurological: Negative for dizziness, weakness,  "headaches and paresthesias.   Psychiatric/Behavioral: Negative for mood changes. The patient is not nervous/anxious.       OBJECTIVE:   /63 (BP Location: Right arm, Patient Position: Sitting, Cuff Size: Adult Regular)   Pulse 101   Temp 98.3  F (36.8  C)   Ht 1.613 m (5' 3.5\")   Wt 64.6 kg (142 lb 8 oz)   SpO2 99%   BMI 24.84 kg/m    Physical Exam  GENERAL: healthy, alert and no distress  EYES: Eyes grossly normal to inspection, PERRL and conjunctivae and sclerae normal  HENT: ear canals and TM's normal, nose and mouth without ulcers or lesions. Severe dental decay with multiple teeth with significant degeneration. No visualized areas of particular erythema or purulent drainage.   NECK: no adenopathy, no asymmetry, masses, or scars and thyroid normal to palpation  RESP: lungs clear to auscultation - no rales, rhonchi or wheezes  CV: regular rate and rhythm, normal S1 S2, no S3 or S4, no murmur, click or rub, no peripheral edema and peripheral pulses strong  ABDOMEN: soft, nontender, no hepatosplenomegaly, no masses and bowel sounds normal  MS: no gross musculoskeletal defects noted, no edema  SKIN: no suspicious lesions or rashes  NEURO: Normal strength and tone, mentation intact and speech normal  PSYCH: mentation appears delayed (unable to answer most questions without help from aunt), affect flat.    Diagnostic Test Results:  Labs reviewed in Epic    ASSESSMENT/PLAN:   Dea was seen today for physical.    Diagnoses and all orders for this visit:    Routine general medical examination at a health care facility  Patient with significant time away from consistent medical care and with recent significant trauma of loss of mother.     Cervical cancer screening  Patient never sexually active making HPV risk low. Declining PAP smear today. Will need ongoing education regarding utility of PAP smear and intermittent cervical exams once Grandmother has guardianship and will continue her care.     History " "of scoliosis  Has had history of cervical spine curvature per Aunt. Has not been evaluated or seen in a long time per family.   -     XR Cervical Spine 2/3 Views; Future  -     XR Thoracic Spine 2 Views; Future  -     XR Lumbar Spine 2/3 Views; Future    Major depressive disorder with single episode, in remission (H)  Interested in starting medication and therapy.   -     sertraline (ZOLOFT) 25 MG tablet; Take 1 tablet (25 mg) by mouth daily and then increase to 50 mg after 1 week.  -     Adult Mental Health  Referral; Future  - Follow up in 4-6 weeks for med check     Childhood Onset Pervasive Developmental Autistic Disorder Full Syndrome Present  -     Adult Mental Health  Referral; Future  - Grandmother is in the process of obtaining guardianship     Attention deficit hyperactivity disorder (ADHD), unspecified ADHD type  -     Adult Mental Health  Referral; Future    Vasomotor rhinitis  -     cetirizine (ZYRTEC) 10 MG tablet; Take 1 tablet (10 mg) by mouth daily    Encounter for contraceptive management, unspecified type  Used for her menorrhagia  -     medroxyPROGESTERone (DEPO-PROVERA) 150 MG/ML syringe; Inject 1 mL (150 mg) into the muscle every 3 months    Other orders  -     REVIEW OF HEALTH MAINTENANCE PROTOCOL ORDERS    Patient has been advised of split billing requirements and indicates understanding: Yes    COUNSELING:  Reviewed preventive health counseling, as reflected in patient instructions    Estimated body mass index is 24.84 kg/m  as calculated from the following:    Height as of this encounter: 1.613 m (5' 3.5\").    Weight as of this encounter: 64.6 kg (142 lb 8 oz).        She reports that she has never smoked. She has been exposed to tobacco smoke. She has never used smokeless tobacco.      Counseling Resources:  ATP IV Guidelines  Pooled Cohorts Equation Calculator  Breast Cancer Risk Calculator  BRCA-Related Cancer Risk Assessment: FHS-7 Tool  FRAX Risk " Assessment  ICSI Preventive Guidelines  Dietary Guidelines for Americans, 2010  USDA's MyPlate  ASA Prophylaxis  Lung CA Screening    Norma Pan MD  [facility]

## 2022-11-10 ENCOUNTER — DOCUMENTATION ONLY (OUTPATIENT)
Dept: OTHER | Facility: CLINIC | Age: 22
End: 2022-11-10

## 2022-11-10 ENCOUNTER — OFFICE VISIT (OUTPATIENT)
Dept: PEDIATRICS | Facility: CLINIC | Age: 22
End: 2022-11-10
Payer: COMMERCIAL

## 2022-11-10 VITALS
HEIGHT: 64 IN | BODY MASS INDEX: 25.44 KG/M2 | WEIGHT: 149 LBS | OXYGEN SATURATION: 99 % | SYSTOLIC BLOOD PRESSURE: 118 MMHG | TEMPERATURE: 99.5 F | RESPIRATION RATE: 18 BRPM | DIASTOLIC BLOOD PRESSURE: 80 MMHG | HEART RATE: 104 BPM

## 2022-11-10 DIAGNOSIS — F41.1 GAD (GENERALIZED ANXIETY DISORDER): Primary | ICD-10-CM

## 2022-11-10 PROCEDURE — 99214 OFFICE O/P EST MOD 30 MIN: CPT | Performed by: PEDIATRICS

## 2022-11-10 RX ORDER — ESCITALOPRAM OXALATE 5 MG/1
5 TABLET ORAL DAILY
Qty: 90 TABLET | Refills: 0 | Status: SHIPPED | OUTPATIENT
Start: 2022-11-10 | End: 2023-03-30

## 2022-11-10 ASSESSMENT — PAIN SCALES - GENERAL: PAINLEVEL: NO PAIN (1)

## 2022-11-10 NOTE — PROGRESS NOTES
"  Assessment & Plan     ИРИНА (generalized anxiety disorder)  Did not tolerate sertraline.  Will trial lexapro - see PI.   - escitalopram (LEXAPRO) 5 MG tablet; Take 1 tablet (5 mg) by mouth daily             Patient Instructions   Start the lexapro - start with 2.5mg.  If you are tolerating this, increase to 5mg after a week.    Then we will follow up in 8 weeks.     Purnima Cali MD  Internal Medicine/Pediatrics  [facility]        Return in about 8 weeks (around 1/5/2023) for Follow up, with me, in person.    Purnima Cali MD  [facility] LISA Malin is a 21 year old, presenting for the following health issues:  Recheck Medication      HPI     Medication Followup of  Zoloft    Taking Medication as prescribed: NO    Side Effects:  It lowered Blood  Presure    Medication Helping Symptoms:  NO    Last visit 10/12/22 - had discussed starting sertraline.     Patient tried 25mg dose and got pale, lightheaded, stopped.         Review of Systems         Objective    /80 (BP Location: Right arm, Patient Position: Sitting, Cuff Size: Adult Regular)   Pulse 104   Temp 99.5  F (37.5  C) (Tympanic)   Resp 18   Ht 1.613 m (5' 3.5\")   Wt 67.6 kg (149 lb)   SpO2 99%   BMI 25.98 kg/m    Body mass index is 25.98 kg/m .  Physical Exam                       "

## 2022-11-10 NOTE — PATIENT INSTRUCTIONS
Start the lexapro - start with 2.5mg.  If you are tolerating this, increase to 5mg after a week.    Then we will follow up in 8 weeks.     Purnima Cali MD  Internal Medicine/Pediatrics  [facility]

## 2022-12-19 DIAGNOSIS — Z30.9 ENCOUNTER FOR CONTRACEPTIVE MANAGEMENT, UNSPECIFIED TYPE: ICD-10-CM

## 2022-12-20 RX ORDER — MEDROXYPROGESTERONE ACETATE 150 MG/ML
150 INJECTION, SUSPENSION INTRAMUSCULAR
Qty: 1 ML | Refills: 0 | Status: SHIPPED | OUTPATIENT
Start: 2022-12-20 | End: 2023-03-10

## 2022-12-21 NOTE — TELEPHONE ENCOUNTER
"Routing refill request to provider for review/approval because:  Needs review    Last Written Prescription Date:  10/12/22  Last Fill Quantity: 1,  # refills: 3   Last office visit provider:  11/10/22     Requested Prescriptions   Pending Prescriptions Disp Refills     medroxyPROGESTERone (DEPO-PROVERA) 150 MG/ML syringe [Pharmacy Med Name: medroxyPROGESTERone Acetate Intramuscular Suspension Prefilled Syringe 150 MG/ML] 1 mL 0     Sig: Inject 1 mL (150 mg) into the muscle every 3 months       Contraceptives Protocol Passed - 12/19/2022  2:48 PM        Passed - Patient is not a current smoker if age is 35 or older        Passed - Recent (12 mo) or future (30 days) visit within the authorizing provider's specialty     Patient has had an office visit with the authorizing provider or a provider within the authorizing providers department within the previous 12 mos or has a future within next 30 days. See \"Patient Info\" tab in inbasket, or \"Choose Columns\" in Meds & Orders section of the refill encounter.              Passed - Medication is active on med list        Passed - No active pregnancy on record        Passed - No positive pregnancy test in past 12 months             Rupali Faustin RN 12/20/22 7:41 PM  "

## 2023-02-27 ENCOUNTER — OFFICE VISIT (OUTPATIENT)
Dept: PEDIATRICS | Facility: CLINIC | Age: 23
End: 2023-02-27
Payer: COMMERCIAL

## 2023-02-27 VITALS
BODY MASS INDEX: 26.5 KG/M2 | TEMPERATURE: 99.3 F | HEART RATE: 108 BPM | DIASTOLIC BLOOD PRESSURE: 70 MMHG | OXYGEN SATURATION: 99 % | SYSTOLIC BLOOD PRESSURE: 104 MMHG | WEIGHT: 152 LBS | RESPIRATION RATE: 16 BRPM

## 2023-02-27 DIAGNOSIS — F41.1 GENERALIZED ANXIETY DISORDER: Primary | ICD-10-CM

## 2023-02-27 PROBLEM — F32.5 MAJOR DEPRESSIVE DISORDER WITH SINGLE EPISODE, IN REMISSION (H): Status: RESOLVED | Noted: 2018-12-12 | Resolved: 2023-02-27

## 2023-02-27 PROCEDURE — 99214 OFFICE O/P EST MOD 30 MIN: CPT | Performed by: NURSE PRACTITIONER

## 2023-02-27 RX ORDER — FLUOXETINE 10 MG/1
10 CAPSULE ORAL DAILY
Qty: 30 CAPSULE | Refills: 1 | Status: SHIPPED | OUTPATIENT
Start: 2023-02-27 | End: 2023-03-30

## 2023-02-27 RX ORDER — HYDROXYZINE HYDROCHLORIDE 25 MG/1
25 TABLET, FILM COATED ORAL 3 TIMES DAILY PRN
Qty: 20 TABLET | Refills: 0 | Status: SHIPPED | OUTPATIENT
Start: 2023-02-27 | End: 2023-03-30

## 2023-02-27 ASSESSMENT — PAIN SCALES - GENERAL: PAINLEVEL: NO PAIN (0)

## 2023-02-27 NOTE — PROGRESS NOTES
Assessment & Plan     Generalized anxiety disorder  Plan to start low dose fluoxetine, 10 mg daily. Will also trial hydroxyzine as needed for breakthrough anxiety. Discussed with patient that fluoxetine takes up to a month to take effect. She will follow-up virtually in 1 month, sooner if concerns. Should she not tolerate fluoxetine, will recommend she follow-up with psychiatry given she will have failed three ssris due to side effects. Referral placed to both psychiatry and counseling.   - FLUoxetine (PROZAC) 10 MG capsule; Take 1 capsule (10 mg) by mouth daily  - hydrOXYzine (ATARAX) 25 MG tablet; Take 1 tablet (25 mg) by mouth 3 times daily as needed for anxiety  - Adult Mental Health  Referral; Future  - Adult Mental Health  Referral; Future      22 minutes spent on the date of the encounter doing chart review, patient visit, documentation and discussion with family        MEDICATIONS:        - Start taking fluoxetine 10 mg daily       - Continue other medications without change  CONSULTATION/REFERRAL to Psychiatry, Counseling  FUTURE APPOINTMENTS:       - Follow-up visit in 1 month for med recheck.       - Will also need separate appt to establish care.     Return in about 4 weeks (around 3/27/2023) for Follow-up, Depression and/or Anxiety, Video Visit.    DARA August [facility] LISA Malin is a 22 year old accompanied by her mother, presenting for the following health issues:  Follow Up      Presents to establish care and a follow up from OV 11/10/22.     Was prescribed lexapro 11/10/22. She only took one dose of medication before stopping due to side effects. These included low blood pressure, appearing pale, feeling faint. Reports today she doesn't want to be on an antidepressant as she does not struggle with depression, it is only anxiety. Struggles in social environments, has what are described to be anxiety attacks. These also happen  while she is at home. She is interested in trying alprazolam as needed. Tried one of her family members once and it worked well.     She lost her mother last March.     Patient Active Problem List   Diagnosis     Allergy To Pollens Weeds Ragweed     Acne     Childhood Onset Pervasive Developmental Autistic Disorder Full Syndrome Present     Tachycardia     Syncope, unspecified syncope type  Normal Echocardiogram 11/4/2021      Dysfunctional Uterine Bleeding     Attention deficit hyperactivity disorder (ADHD)     Vitamin B12 deficiency (non anemic)     Vitamin D deficiency     Generalized anxiety disorder     No past medical history on file.     Current Outpatient Medications   Medication     b complex vitamins tablet     cetirizine (ZYRTEC) 10 MG tablet     cholecalciferol, vitamin D3, 400 unit/mL Drop drops     FLUoxetine (PROZAC) 10 MG capsule     hydrOXYzine (ATARAX) 25 MG tablet     medroxyPROGESTERone (DEPO-PROVERA) 150 MG/ML syringe     escitalopram (LEXAPRO) 5 MG tablet     MEDICATION CANNOT BE REORDERED - PLEASE MANUALLY REORDER AND DISCONTINUE THE OLD ORDER     pediatric multivit-iron-min (CEROVITE JR) Chew     No current facility-administered medications for this visit.        Allergies   Allergen Reactions     Azithromycin Unknown     Influenza Virus Vaccine Whole [Influenza Vaccines] Rash     Red Dye Rash       Review of Systems    ROS: 10 point ROS neg other than the symptoms noted above in the HPI.        Objective    /70   Pulse 108   Temp 99.3  F (37.4  C) (Tympanic)   Resp 16   Wt 68.9 kg (152 lb)   SpO2 99%   BMI 26.50 kg/m    Body mass index is 26.5 kg/m .  Physical Exam  Constitutional:       General: She is not in acute distress.     Appearance: Normal appearance. She is not ill-appearing or toxic-appearing.   Cardiovascular:      Rate and Rhythm: Tachycardia present.   Pulmonary:      Effort: Pulmonary effort is normal. No respiratory distress.   Neurological:      General: No focal  deficit present.      Mental Status: She is alert and oriented to person, place, and time.   Psychiatric:         Mood and Affect: Mood normal.         Behavior: Behavior normal.

## 2023-03-09 DIAGNOSIS — Z30.9 ENCOUNTER FOR CONTRACEPTIVE MANAGEMENT, UNSPECIFIED TYPE: ICD-10-CM

## 2023-03-10 RX ORDER — MEDROXYPROGESTERONE ACETATE 150 MG/ML
150 INJECTION, SUSPENSION INTRAMUSCULAR
Qty: 1 ML | Refills: 0 | Status: SHIPPED | OUTPATIENT
Start: 2023-03-10 | End: 2023-06-09

## 2023-03-10 NOTE — TELEPHONE ENCOUNTER
"Routing refill request to provider for review/approval because:  Patient needs to be seen because it has been more than 1 year since last office visit.    Last Written Prescription Date:  12/20/22  Last Fill Quantity: 1,  # refills: 0   Last office visit provider:  11/10/22     Requested Prescriptions   Pending Prescriptions Disp Refills     medroxyPROGESTERone (DEPO-PROVERA) 150 MG/ML syringe [Pharmacy Med Name: medroxyPROGESTERone Acetate Intramuscular Suspension Prefilled Syringe 150 MG/ML] 1 mL 0     Sig: Inject 1 mL (150 mg) into the muscle every 3 months       Contraceptives Protocol Failed - 3/9/2023  6:02 PM        Failed - Recent (12 mo) or future (30 days) visit within the authorizing provider's specialty     Patient has had an office visit with the authorizing provider or a provider within the authorizing providers department within the previous 12 mos or has a future within next 30 days. See \"Patient Info\" tab in inbasket, or \"Choose Columns\" in Meds & Orders section of the refill encounter.              Passed - Patient is not a current smoker if age is 35 or older        Passed - Medication is active on med list        Passed - No active pregnancy on record        Passed - No positive pregnancy test in past 12 months             Rupali Faustin RN 03/10/23 11:47 AM  "

## 2023-03-30 ENCOUNTER — VIRTUAL VISIT (OUTPATIENT)
Dept: PEDIATRICS | Facility: CLINIC | Age: 23
End: 2023-03-30
Payer: COMMERCIAL

## 2023-03-30 DIAGNOSIS — F41.1 GENERALIZED ANXIETY DISORDER: ICD-10-CM

## 2023-03-30 PROCEDURE — 99213 OFFICE O/P EST LOW 20 MIN: CPT | Mod: 93 | Performed by: NURSE PRACTITIONER

## 2023-03-30 RX ORDER — HYDROXYZINE HYDROCHLORIDE 25 MG/1
25 TABLET, FILM COATED ORAL 3 TIMES DAILY PRN
Qty: 30 TABLET | Refills: 3 | Status: SHIPPED | OUTPATIENT
Start: 2023-03-30 | End: 2023-09-08

## 2023-03-30 NOTE — PROGRESS NOTES
Dea is a 22 year old who is being evaluated via a billable telephone visit.      What phone number would you like to be contacted at? [phone]  How would you like to obtain your AVS? Ivana    Distant Location (provider location):  Off-site    Assessment & Plan     Generalized anxiety disorder  Did not tolerate fluoxetine, stopped after one week on the medication. Has been using hydroxyzine as needed and this has been working well. Refilled. Referral to psych as previously discussed for medication management given she has failed 3 ssris. Will have PAL assist with scheduling. Pt will return 4/13 to formally establish care.   - hydrOXYzine (ATARAX) 25 MG tablet; Take 1 tablet (25 mg) by mouth 3 times daily as needed for anxiety      16 minutes spent by me on the date of the encounter doing chart review, patient visit, documentation and discussion with family        MEDICATIONS:        - Discontinue fluoxetine       - Continue other medications without change  FUTURE APPOINTMENTS:       - Follow-up visit 4/13 as scheduled.     DARA August [facility] LISA    Joselin Malin is a 22 year old, presenting for the following health issues:  Recheck Medication    HPI     Presents for follow-up and medication recheck. Started fluoxetine and hydroxyzine as needed one month ago. Stopped taking fluoxetine one week after starting the medication. Noticed her tongue was white and mouth was dry. Was having more panic attacks. She has been utilizing the hydroxyzine as needed and has found this to be really helpful. Would like a refill. Her panic attacks are closely linked to being social situations.       Patient Active Problem List   Diagnosis     Allergy To Pollens Weeds Ragweed     Acne     Childhood Onset Pervasive Developmental Autistic Disorder Full Syndrome Present     Tachycardia     Syncope, unspecified syncope type  Normal Echocardiogram 11/4/2021      Dysfunctional Uterine  Bleeding     Attention deficit hyperactivity disorder (ADHD)     Vitamin B12 deficiency (non anemic)     Vitamin D deficiency     Generalized anxiety disorder     Current Outpatient Medications   Medication     hydrOXYzine (ATARAX) 25 MG tablet     medroxyPROGESTERone (DEPO-PROVERA) 150 MG/ML syringe     b complex vitamins tablet     cetirizine (ZYRTEC) 10 MG tablet     cholecalciferol, vitamin D3, 400 unit/mL Drop drops     MEDICATION CANNOT BE REORDERED - PLEASE MANUALLY REORDER AND DISCONTINUE THE OLD ORDER     pediatric multivit-iron-min (CEROVITE JR) Chew     No current facility-administered medications for this visit.        Allergies   Allergen Reactions     Azithromycin Unknown     Influenza Virus Vaccine Whole [Influenza Vaccines] Rash     Red Dye Rash         Review of Systems    ROS: 10 point ROS neg other than the symptoms noted above in the HPI.        Objective       Vitals:  No vitals were obtained today due to virtual visit.    Physical Exam   healthy, alert and no distress  PSYCH: Alert and oriented times 3; coherent speech, normal   rate and volume, able to articulate logical thoughts, able   to abstract reason, no tangential thoughts, no hallucinations   or delusions  Her affect is normal and pleasant  RESP: No cough, no audible wheezing, able to talk in full sentences  Remainder of exam unable to be completed due to telephone visits        Phone call duration: 6 minutes

## 2023-03-30 NOTE — Clinical Note
Hi Durga - this patient will be establishing with me in a couple weeks but she will definitely be a 4. When I met her last month I placed referral to mental health for both counseling and psychiatry/med management. She did not schedule. I am hoping you can assist with this. Guardian was wondering where the appt would take place and I didn't know that answer...hoping you can connect with the patient/family and let them know the location as well. Thank you!

## 2023-04-13 ENCOUNTER — OFFICE VISIT (OUTPATIENT)
Dept: PEDIATRICS | Facility: CLINIC | Age: 23
End: 2023-04-13
Payer: COMMERCIAL

## 2023-04-13 VITALS
SYSTOLIC BLOOD PRESSURE: 112 MMHG | DIASTOLIC BLOOD PRESSURE: 74 MMHG | WEIGHT: 148 LBS | TEMPERATURE: 98 F | HEART RATE: 113 BPM | HEIGHT: 64 IN | OXYGEN SATURATION: 98 % | RESPIRATION RATE: 16 BRPM | BODY MASS INDEX: 25.27 KG/M2

## 2023-04-13 DIAGNOSIS — F41.9 ANXIETY: ICD-10-CM

## 2023-04-13 DIAGNOSIS — F84.0 AUTISM SPECTRUM DISORDER: ICD-10-CM

## 2023-04-13 DIAGNOSIS — Z76.89 ENCOUNTER TO ESTABLISH CARE: Primary | ICD-10-CM

## 2023-04-13 PROCEDURE — 99213 OFFICE O/P EST LOW 20 MIN: CPT | Performed by: NURSE PRACTITIONER

## 2023-04-13 RX ORDER — VITAMIN B COMPLEX
TABLET ORAL DAILY
COMMUNITY

## 2023-04-13 ASSESSMENT — PATIENT HEALTH QUESTIONNAIRE - PHQ9
SUM OF ALL RESPONSES TO PHQ QUESTIONS 1-9: 1
SUM OF ALL RESPONSES TO PHQ QUESTIONS 1-9: 1
10. IF YOU CHECKED OFF ANY PROBLEMS, HOW DIFFICULT HAVE THESE PROBLEMS MADE IT FOR YOU TO DO YOUR WORK, TAKE CARE OF THINGS AT HOME, OR GET ALONG WITH OTHER PEOPLE: NOT DIFFICULT AT ALL

## 2023-04-13 ASSESSMENT — PAIN SCALES - GENERAL: PAINLEVEL: NO PAIN (0)

## 2023-04-13 NOTE — PROGRESS NOTES
Assessment & Plan     Encounter to establish care  Histories and mediations reviewed and updated.     Anxiety  Currently utilizing hydroxyzine as needed, a couple times weekly. Has upcoming appt with [facility] on 5/3.    Autism spectrum disorder  Not currently receiving services of any sort.         20 minutes spent by me on the date of the encounter doing chart review, patient visit, documentation and discussion with family          MEDICATIONS:  Continue current medications without change  FUTURE APPOINTMENTS:       - Follow-up for annual visit or as needed    DARA August [facility] LISA Malin is a 22 year old, presenting for the following health issues:  Establish Care    Presents to establish primary care with new provider.     #Anxiety  Using hydroxyzine as needed for anxiety, using couple times weekly. Upcoming appt with [facility] on 5/3. Not currently following with therapist or psychiatrist. Has failed multiple medications (escitalopram, fluoxetine) due to side effects in the past.     #Austism spectrum disorder  Lives with her aunt, grandmother, and sister (17). Mother passed away last year. Father not in the picture. Patient is unemployed. Highest completed grade is 6th grade (home schooled).     Patient Active Problem List   Diagnosis     Allergy To Pollens Weeds Ragweed     Acne     Autism spectrum disorder     Tachycardia     Syncope, unspecified syncope type  Normal Echocardiogram 11/4/2021      Attention deficit hyperactivity disorder (ADHD)     Vitamin B12 deficiency (non anemic)     Vitamin D deficiency     Generalized anxiety disorder     Past Medical History:   Diagnosis Date     Anxiety      Autism spectrum disorder      History reviewed. No pertinent surgical history.  Family History   Problem Relation Age of Onset     Anxiety Disorder Mother      Deep Vein Thrombosis (DVT) Mother      Pulmonary Embolism Mother      Endometriosis Mother      Bipolar  Disorder Mother      Back Pain Mother      Skin Cancer Mother      Hypertension Mother      No Known Problems Father      Scoliosis Sister      Autism Spectrum Disorder Sister      Anxiety Disorder Maternal Aunt      Social History     Socioeconomic History     Marital status: Single     Spouse name: Not on file     Number of children: Not on file     Years of education: Not on file     Highest education level: Not on file   Occupational History     Not on file   Tobacco Use     Smoking status: Never     Passive exposure: Yes     Smokeless tobacco: Never   Vaping Use     Vaping status: Never Used   Substance and Sexual Activity     Alcohol use: Yes     Comment: Wine occasionally, 1 glass every few months.     Drug use: No     Sexual activity: Not Currently     Partners: Female, Male     Birth control/protection: Injection   Other Topics Concern     Not on file   Social History Narrative    Does not work, is not in school.    Completed 6th grade, home schooled.     Mother passed away last year. Father not in the picture for many years.     Helps her grandmother out around the house.     Lives with her grandmother and aunt and sister (age 17) in [place].    Free time: playing on tablet, making bracelets.     Would like to move eventually.         Ailyn Gonzales, DNP, APRN, CNP    04/13/23     Social Determinants of Health     Financial Resource Strain: Low Risk  (10/12/2022)    Overall Financial Resource Strain (CARDIA)      Difficulty of Paying Living Expenses: Not very hard   Food Insecurity: No Food Insecurity (10/12/2022)    Hunger Vital Sign      Worried About Running Out of Food in the Last Year: Never true      Ran Out of Food in the Last Year: Never true   Transportation Needs: Unknown (10/12/2022)    PRAPARE - Transportation      Lack of Transportation (Medical): Patient refused      Lack of Transportation (Non-Medical): Patient refused   Physical Activity: Insufficiently Active (10/12/2022)     "Exercise Vital Sign      Days of Exercise per Week: 6 days      Minutes of Exercise per Session: 20 min   Stress: No Stress Concern Present (10/12/2022)    Citizen of [place] of Occupational Health - Occupational Stress Questionnaire      Feeling of Stress : Only a little   Social Connections: Unknown (10/12/2022)    Social Connection and Isolation Panel [NHANES]      Frequency of Communication with Friends and Family: Twice a week      Frequency of Social Gatherings with Friends and Family: More than three times a week      Attends Moravian Services: Never      Active Member of Clubs or Organizations: Patient refused      Attends Club or Organization Meetings: Not on file      Marital Status: Never    Intimate Partner Violence: Not on file   Housing Stability: Unknown (10/12/2022)    Housing Stability Vital Sign      Unable to Pay for Housing in the Last Year: Patient refused      Number of Places Lived in the Last Year: 1      Unstable Housing in the Last Year: No     Current Outpatient Medications   Medication     b complex vitamins tablet     cetirizine (ZYRTEC) 10 MG tablet     hydrOXYzine (ATARAX) 25 MG tablet     medroxyPROGESTERone (DEPO-PROVERA) 150 MG/ML syringe     Vitamin D3 (CHOLECALCIFEROL) 25 mcg (1000 units) tablet     No current facility-administered medications for this visit.        Allergies   Allergen Reactions     Azithromycin Unknown     Influenza Virus Vaccine Whole [Influenza Vaccines] Rash     Red Dye Rash         Review of Systems    ROS: 10 point ROS neg other than the symptoms noted above in the HPI.        Objective    /74   Pulse 113   Temp 98  F (36.7  C) (Tympanic)   Resp 16   Ht 1.613 m (5' 3.5\")   Wt 67.1 kg (148 lb)   SpO2 98%   BMI 25.81 kg/m    Body mass index is 25.81 kg/m .  Physical Exam  Constitutional:       General: She is not in acute distress.     Appearance: Normal appearance. She is not ill-appearing or toxic-appearing.   Cardiovascular:      Rate " and Rhythm: Tachycardia present.   Pulmonary:      Effort: Pulmonary effort is normal. No respiratory distress.   Neurological:      General: No focal deficit present.      Mental Status: She is alert and oriented to person, place, and time.   Psychiatric:         Mood and Affect: Mood normal.         Behavior: Behavior normal.         Thought Content: Thought content normal.

## 2023-05-03 ENCOUNTER — VIRTUAL VISIT (OUTPATIENT)
Dept: PSYCHIATRY | Facility: CLINIC | Age: 23
End: 2023-05-03
Attending: NURSE PRACTITIONER
Payer: COMMERCIAL

## 2023-05-03 ENCOUNTER — VIRTUAL VISIT (OUTPATIENT)
Dept: BEHAVIORAL HEALTH | Facility: CLINIC | Age: 23
End: 2023-05-03
Attending: NURSE PRACTITIONER
Payer: COMMERCIAL

## 2023-05-03 DIAGNOSIS — F41.1 GENERALIZED ANXIETY DISORDER: Primary | ICD-10-CM

## 2023-05-03 DIAGNOSIS — F84.0 AUTISM SPECTRUM DISORDER: Primary | ICD-10-CM

## 2023-05-03 DIAGNOSIS — F41.9 ANXIETY: ICD-10-CM

## 2023-05-03 DIAGNOSIS — F84.0 AUTISM SPECTRUM DISORDER: ICD-10-CM

## 2023-05-03 PROCEDURE — 99417 PROLNG OP E/M EACH 15 MIN: CPT | Mod: VID | Performed by: NURSE PRACTITIONER

## 2023-05-03 PROCEDURE — 90791 PSYCH DIAGNOSTIC EVALUATION: CPT | Mod: VID | Performed by: COUNSELOR

## 2023-05-03 PROCEDURE — 99205 OFFICE O/P NEW HI 60 MIN: CPT | Mod: VID | Performed by: NURSE PRACTITIONER

## 2023-05-03 ASSESSMENT — COLUMBIA-SUICIDE SEVERITY RATING SCALE - C-SSRS
ATTEMPT LIFETIME: NO
TOTAL  NUMBER OF ABORTED OR SELF INTERRUPTED ATTEMPTS LIFETIME: NO
6. HAVE YOU EVER DONE ANYTHING, STARTED TO DO ANYTHING, OR PREPARED TO DO ANYTHING TO END YOUR LIFE?: NO
1. HAVE YOU WISHED YOU WERE DEAD OR WISHED YOU COULD GO TO SLEEP AND NOT WAKE UP?: NO
2. HAVE YOU ACTUALLY HAD ANY THOUGHTS OF KILLING YOURSELF?: NO
TOTAL  NUMBER OF INTERRUPTED ATTEMPTS LIFETIME: NO

## 2023-05-03 ASSESSMENT — PATIENT HEALTH QUESTIONNAIRE - PHQ9
SUM OF ALL RESPONSES TO PHQ QUESTIONS 1-9: 1
10. IF YOU CHECKED OFF ANY PROBLEMS, HOW DIFFICULT HAVE THESE PROBLEMS MADE IT FOR YOU TO DO YOUR WORK, TAKE CARE OF THINGS AT HOME, OR GET ALONG WITH OTHER PEOPLE: SOMEWHAT DIFFICULT
10. IF YOU CHECKED OFF ANY PROBLEMS, HOW DIFFICULT HAVE THESE PROBLEMS MADE IT FOR YOU TO DO YOUR WORK, TAKE CARE OF THINGS AT HOME, OR GET ALONG WITH OTHER PEOPLE: SOMEWHAT DIFFICULT
SUM OF ALL RESPONSES TO PHQ QUESTIONS 1-9: 1

## 2023-05-03 NOTE — PROGRESS NOTES
"Virtual Visit Details    Type of service:  Video Visit   Video Start Time: 9:47 AM  Video End Time:10:40 AM  Originating Location (pt. Location): Home    Distant Location (provider location):  Off-site  Platform used for Video Visit: [facility]  [address]  [place]  [address]   [phone]     Collaborative Care Psychiatry Service ([facility])  Initial Evaluation    IDENTIFICATION     Dea Garcia MRN# [id number]   Age: 22 year old  YOB: 2000   Preferred name:  Dea       Referred by:  DARA August CNP      Reason for referral:  Has filed 3 ssris in the past - hx anxiety  History is obtained from the patient, patient's guardian (Kerrie/grandma), EHR records, and information obtained by [facility] during today's team-based visit.     CHIEF COMPLAINT   \"Panic attacks\"    HISTORY OF PRESENT ILLNESS   Dea is a 22 year old female with autism who presents for optimization of psychotropic medications for anxiety.  Collaborative Care Psychiatry Service ([facility]) model of care reviewed with patient who verbalized understanding.    Mom is .  Presents with her grandmother, Kerrie, who is her legal guardian, and her maternal aunt who also lives with them.  Grandma reports patient struggles with panic attacks.  Triggers include being around too many people and in public.  Gets very worried and anxious.  Unable to describe any specific worries.  When in public, she feels as though others are not giving her space and are closing in on her.  Gets panic attacks a couple times a week.  Onset was age 11, but reportedly she never said anything about them back then.      Hydroxyzine is effective for her as needed whenever her anxiety is really high.  Sometimes uses it 2 days in a row, which helps anxiety completely resolves.  Denies any negative side effects from the hydroxyzine.  Would like to keep this on board.  Last " "dose was earlier today.  In an average week, uses the hydroxyzine maybe 2-3 times.  Other methods to reduce anxiety and panic include \"tries being herself\" and breathing.  Tries to yawn just to breathe, which somewhat helps.    Was present for mother's death.  Denies nightmares or significant changes after mom's death.  Reportedly, she and her mom were never very close. Has been closer to her grandma, Kerrie. Mom states \"Courtney wasn't the best mother figure.\"     Goals: Grandma would like patient to not have the breathing problem with her anxiety.    MEDICATIONS   CURRENT MEDICATIONS  Current Outpatient Medications   Medication Sig     b complex vitamins tablet [B COMPLEX VITAMINS TABLET] Take 1 tablet by mouth daily.     cetirizine (ZYRTEC) 10 MG tablet Take 1 tablet (10 mg) by mouth daily     hydrOXYzine (ATARAX) 25 MG tablet Take 1 tablet (25 mg) by mouth 3 times daily as needed for anxiety     medroxyPROGESTERone (DEPO-PROVERA) 150 MG/ML syringe Inject 1 mL (150 mg) into the muscle every 3 months     Vitamin D3 (CHOLECALCIFEROL) 25 mcg (1000 units) tablet Take by mouth daily     No current facility-administered medications for this visit.     Side effects:  Denies  Medication adherence:  Reports good med adherence.    PAST PSYCHOTROPIC MEDICATIONS  Lexapro 5 mg - 1 dose. Low blood pressure, faint, pale.   Prozac (fluoxetine) 10 mg - nausea, stoped taking. Tongue was white, mouth was dry.   Zoloft (sertraline) 25 mg x1 week, then 50 mg -  Pale, lightheaded. Went away after she stopped taking it after about a week.     ALLERGIES  Allergies   Allergen Reactions     Azithromycin Unknown     Influenza Virus Vaccine Whole [Influenza Vaccines] Rash     Red Dye Rash     Per family, she cannot touch red dye but can consume it.      DRUG MONITORING:  [place] Prescription Monitoring Program evaluating controlled substances in the last year in [place]:  [place] Pres[place] Monitoring [place] review was not needed " "today.    PSYCHIATRIC HISTORY   Psychiatric hospitalizations:  No  Past evaluation and treatment:  At [facility] was diagnosed with autism around age 3.  Current mental health services:  No  Guardian is Kerrie thomason.  Suicide attempts/near attempts:  No  Homicidal ideation or serious physical aggression:  No  NSSI:  Denies a history of SIB.    Legal history:  None other than having legal guardian  Electroconvulsive Therapy (ECT) or Transcranial Magnetic Stimulation (TMS):  No  PharmacogenomicTesting (such as [employer]):  No    MEDICAL, SURGICAL, FAMILY, & SOCIAL HISTORY   PAST MEDICAL HISTORY  Active Ambulatory Problems     Diagnosis Date Noted     Allergy To Pollens Weeds Ragweed      Acne      Autism spectrum disorder      Tachycardia      Syncope, unspecified syncope type  Normal Echocardiogram 11/4/2021       Attention deficit hyperactivity disorder (ADHD)      Vitamin B12 deficiency (non anemic) 12/17/2018     Vitamin D deficiency 12/17/2018     Generalized anxiety disorder 02/27/2023     Resolved Ambulatory Problems     Diagnosis Date Noted     Dysfunctional Uterine Bleeding      Major depressive disorder with single episode, in remission (H) 12/12/2018     Hypertension      Anxiety 04/13/2023     No Additional Past Medical History      Current medical problems:  \"My teeth.\" Tooth decay and abscesses.  Getting it addressed through dentist now ([facility] in [place]).    Denies known history of tics, seizures, or head injuries.   Stereotypic movement, like hand flapping, in past but no longer.   High pain tolerance.  Fractured R little finger when she was really little, and she continued to run and play without complaining of the fracture.  When they ask her if anything hurts, she will say \"I don't know.\" Grandma is a good advocate for her in this regard.    PAST SURGICAL HISTORY  Past Surgical History:   Procedure Laterality Date     NO HISTORY OF SURGERY        FAMILY HISTORY  Bipolar disorder - " "Mom, maternal grandmother, pretty sure maternal great grandfather  Depression - maternal aunt, maternal great grandmother, and maternal great grandfather   Doesn't know much about paternal family history, \"but he acted bipolar.\"   Sister with autism.     ALESSANDRO   Great grandfather w/ alcoholism.  Kerrie w/ h/o alcoholism but sober since .   Grandfather with alcoholism.   Mom - Abused opioids.  Would get 182 Percocets every 28 days through pain clinic and regular Adderall Rx, crush and snort them.  Mom   from unknown cause. Went into cardiac arrest, but they don't know much more than that about the cause of death.  Mom was 40.     Known history of suicide completion in family?    Yes x2.  1)  In , Kerrie's brother murdered his g/f and killed himself. Patient saw it on the news and in the paper.  Dea wasn't close to him.   2)  Kerrie's uncle killed himself during a heart attack. Said he knew he would be dead by the time paramedics came, so he \"did it faster\" via firearm.     SOCIAL HISTORY  Siblings:  One maternal half sister, age 17, Nazario, lives at home.  Highest level of education completed:  Homeschooled after 6th grade.  Completed 12th grade. Got a \"certificate.\" Still keeps in contact with .  Every year until 6th grade had an IEP. Was still 2 years behind at that time.   Occupation:  Never had a job.  Work status:  unemployed  Support system:  family  Relationship status:  Single, never has been in a relationship  Children:  None.  Interests, hobbies, skills, strengths:  Enjoys spending time with grandma, enjoys beading, playing on her tablet (Hayday and other games).    history:  No  Firearms:  Denies having firearms at home.     Guardianship:  Dea is on social security disability. When she was younger mom had her placed on social security disability and obtained guardianship, as Dea required a lot of assistance.  After mom passed away, gma " contacted  because social security required it to transfer guardianship.      Living situation:    Grandma and patient's mom lived together. Lives with her sister, Nazario (age 17), her guardian (maternal grandmother, Kerrie), and her maternal aunt (Deepika).  When mom was alive, she never moved away from mom's house after Dea was born, so they've always lived in the same home.     Significant life events:    Piper was close to great grandma who also lived with them, and they took care of her until she  of metastatic breast ca in 2016 at their home while on hospice. Dea helped with her at home before her passing.      Dea was also home and witnessed mom's death.  Mom was on the toilet, they couldn't get her off, Deepika (aunt) called paramedics, and Kerrie kept asking what was wrong.  Mom suddenly couldn't speak, and grandma was screaming for help.  Paramedics came and said she didn't have a pulse. They got her out of the bathroom to do CPR.  Dea was in the bathroom with mom from the time before the paramedics came and witnessed events following.      Strengths:  Include family support system. She is connected to grandma, she is always right there. Always helping grandma. Grandma trying to teach her things in case something happens to her. Teaching her cooking.     SUBSTANCE USE  Nicotine - Denies  Alcohol - Denies  Marijuana - Denies  Other recreational drug use - Denies  Caffeine - Drinks soda, about two to three 16.9-oz. bottles per day (Dr. Pepper, Diet Coke, Mountain Dew Red, Cream Soda)    REVIEW OF SYSTEMS   PSYCHIATRIC REVIEW OF SYMPTOMS  Reviewed psychiatric ROS obtained by [facility], LICSW, during today's team-based visit.    PSYCHIATRIC EXAMINATION   Vital signs:  Deferred due to virtual visit.  Appearance:  Appropriate attire.  Alert.  Hair appears unbrushed, otherwise no apparent concerns with hygiene. No abnormal movements. Sitting on couch between  grandma and aunt. Sits very still. Missing a front tooth.   Behavior:  Cooperative, calm, kind.  Eye contact:  Good.  Gait and motor coordination:  Unable to assess via video. and No concerns identified by report.  Speech:  Soft spoken but otherwise appears to be normal.  Often defers to family or seeks their reassurance.  Attention and concentration:  Excellent  Mood:  euthymic  Affect:  Mood congruent.  Full range of emotions.  Orientation:  x4  Thought process:  Organized  Thought content:  Reality based.  Does not appear to be responding to internal stimuli.   Recent and remote memory:  No impairment.   Estimate of intelligence:  Low average  Insight:  Developmentally appropriate  Judgement:  Developmentally appropriate  Long- and short-term memory:  Intact.  Suicidal ideation:  denies  Homicidal ideation:  denies    DIAGNOSTICS AND SCREENINGS   Drug and alcohol screening:  Link to CAGE-AID Screener      5/3/2023     8:50 AM   [facility] Flowsheet   Have you ever felt you should Cut down on your drinking or drug use? 0    0   Have people Annoyed you by criticizing your drinking or drug use? 0    0   Have you ever felt bad or Guilty about your drinking or drug use? 0    0   Have you ever had a drink or used drugs first thing in the morning to steady your nerves or to get rid of a hangover? (Eye opener) 0    0   C[facility]-A[facility] SCORE 0    0   Have you ever felt you should Cut down on your drinking or drug use? No   Have people Annoyed you by criticizing your drinking or drug use? No   Have you ever felt bad or Guilty about your drinking or drug use? No   Have you ever had a drink or used drugs first thing in the morning to steady your nerves or to get rid of a hangover? (Eye opener) No   CAGE-AID SCORE 0 (A total score of 2 or greater is considered clinically significant)     Total score:  0  Interpretation:  Not clinically significant   ALESSANDRO evaluation offered?:  No    Depression screenin/3/2023     8:37 AM   Last  PHQ-9   1.  Little interest or pleasure in doing things 0    0   2.  Feeling down, depressed, or hopeless 0    0   3.  Trouble falling or staying asleep, or sleeping too much 1    1   4.  Feeling tired or having little energy 0    0   5.  Poor appetite or overeating 0    0   6.  Feeling bad about yourself 0    0   7.  Trouble concentrating 0    0   8.  Moving slowly or restless 0    0   Q9: Thoughts of better off dead/self-harm past 2 weeks 0    0   PHQ-9 Total Score 1    1         10/12/2022    12:59 PM 2023     1:04 PM 5/3/2023     8:37 AM   PHQ-9 SCORE   PHQ-9 Total Score MyChart 4 (Minimal depression) 1 (Minimal depression) 1 (Minimal depression)   PHQ-9 Total Score 4 1 1    1     DIAGNOSTIC IMPRESSION   Autism spectrum disorder, F84.0  Anxiety, F41.9    Differential diagnoses:  Rule out intellectual impairment    FORMULATION  Patient is a 22 year old female with h/o autism, by report, who presents with her legal guardian, Kerrie, who is also her maternal grandmother. Mom  last year but wasn't very close to mom. Genetic loading significant for alcoholism, mental illness, suicide, and generational trauma.  Supportive family system including maternal grandma and aunt at home appears to be a great strength for her.      Grandma asked about benzodiazapine, but we discussed rationale for avoiding benzodiazepines.  She may continue the hydroxyzine p.r.n. Intolerant of past SSRI trials, due to s/e.  We discussed MTM consultation with a psych pharmacist.  This was placed.  They were open to this on discussion at intake.  (Addendum: Upon attempts to schedule the MTM consultation, grandma was confused and seemed unaware of the consultation, stated they found a medication that worked, hydroxyzine, and also requested a benzodiazepine.)    Consider therapy.  Unclear at this time what most appropriate therapy would be. Uncertain of pt's cognitive level of functioning.  Wonder if she may benefit from OT, as well.   Certainly would be nice to get her connected to more services for support.     Counseling & informed consent:  Reviewed the following with patient:  Informed Consent and Counseling: recommended treatment risks, benefits, alternatives, coordination of care with other clinicians and risk factor reduction    SAFETY ASSESSMENT   Suicidal ideation: Denies  History of suicide attempts:  No   Hope for the future: present   Hx of Command hallucinations or current psychosis: No  History of Self-injurious behaviors: No   Family member  by suicide:  Yes x2.    Suicide Risk Factors: diagnosis of a mental disorder (especially depression or mood disorders), knowing someone who  by suicide (particularly a family member), chronic disease and disability (autism, on social security disability) and problem solving deficits. Witnessed mom's death via cardiac arrest at 40 yrs old.  Risk is mitigated by the following protective factors: access to behavioral health care, active involvement in treatment, health seeking behaviors, connectedness to individuals, family, stable housing, no access to weapons and no current SI   Based on all available evidence including the factors cited above, overall Risk for harm is low and is appropriate for outpatient level of care.     PLAN     CONTINUE hydroxyzine 25 mg p.r.n. for anxiety.     Referred for psychiatric MTM consultation.  Question whether we should try SSRI again but utilize compounding pharmacy to achieve lower doses versus other recommendations? Has not tolerated 3 past SSRI trials due to side effects at low doses.  All were discontinued within about 1 week.     Refer for neuropsych eval to help us better conceptualize her cognitive strengths and weaknesses to further guide treatment planning. If you do not hear from us within 2 business days, please call [phone] to schedule.     Follow up with [facility] 1 month.     Avoid mood-altering substances not prescribed to you.     Please  contact me via Dishcrawl with any non-urgent concerns or call [phone].     Call or text [phone] for mental health crisis.     Call 911 or use ER for potentially life-threatening situations.     PATIENT STATUS:  Our psychiatry providers act as a specialty service for primary care providers in the [facility] who seek to optimize medications for unstable patients.  Once medications have been optimized, our providers discharge the patient back to the referring primary care provider for ongoing medication management.  This type of system allows our providers to serve a high volume of patients. At this time Patient will continue to be seen for ongoing consultation and stabilization.    BILLING NOTE:  91 minutes were spent performing chart review, patient assessment, documentation, and case management on the date of service.      Pooja Can, APRN, CNP, PNP-PC, PMHNP-BC   Collaborative Care Psychiatry Service ([facility]PS)    Chart documentation done in part with Dragon Voice Recognition software.  Although reviewed after completion, some word and grammatical errors may remain.

## 2023-05-03 NOTE — PROGRESS NOTES
"    [facility] - [place]      PATIENT'S NAME: Dea Garcia  PREFERRED NAME: Dea  PRONOUNS:       MRN: [id number]  : 2000  ADDRESS: [facility] [address]  ACCT. NUMBER:  [id number]  DATE OF SERVICE: 23  START TIME: 907am  END TIME: 938am  PREFERRED PHONE: [phone]  May we leave a program related message: Yes  SERVICE MODALITY:  Video Visit:      Provider verified identity through the following two step process.  Patient provided:  Patient  and Patient was verified at admission/transfer    Telemedicine Visit: The patient's condition can be safely assessed and treated via synchronous audio and visual telemedicine encounter.      Reason for Telemedicine Visit: Services only offered telehealth    Originating Site (Patient Location): Patient's home    Distant Site (Provider Location): Provider Remote Setting- Home Office    Consent:  The patient/guardian has verbally consented to: the potential risks and benefits of telemedicine (video visit) versus in person care; bill my insurance or make self-payment for services provided; and responsibility for payment of non-covered services.     Patient would like the video invitation sent by:  My Chart    Mode of Communication:  Video Conference via Amwell    Distant Location (Provider):  Off-site    As the provider I attest to compliance with applicable laws and regulations related to telemedicine.    UNIVERSAL ADULT Mental Health DIAGNOSTIC ASSESSMENT    Identifying Information:  Patient is a 22 year old,   ;  individual.  Patient was referred for an assessment by  primary care clinic.  Patient attended the session with grandma and aunt.    Chief Complaint:   The reason for seeking services at this time is: \"Anxiety\".  The problem(s) began 11.    Patient has attempted to resolve these concerns in the past through PCP, medication " "trials.    Reason for [facility]: Pt reports that they tried medication in the past for anxiety and it wasn't affective. Pt has tried to do things to help manage anxiety and it got worse.  Pt will have moments where it is hard to breath and it happens out of no where.   Pt's mom passed away last March and grandma has guardianship.     Pt has autism.     Hope to: medication without side effects to manage anxiety       Social/Family History:  Patient reported they grew up in [place]  . They were raised by biological mother; grandmother; aunt; other Great grandmother. Parents  /  when pt was 2 months old. Patient reported that their childhood was \"good\".  Patient described their current relationships with family of origin as get along with grandma well.     The patient describes their cultural background as ; .  Cultural influences and impact on patient's life structure, values, norms, and healthcare: \" I raised Christian and Native Americans traditions.\"  Contextual influences on patient's health include: Contextual Factors: Individual Factors , , has siblings and pets, hx of anxiety and panic attacks, was homeschooled, doesn't have friends, is disabled, past dx of autism and Family Factors lives with grandma and aunt and pets, mom passed away last March, pt gets along with family. These factors will be addressed in the Preliminary Treatment plan. Patient identified their preferred language to be English. Patient reported they does not need the assistance of an  or other support involved in therapy.     Patient reported had no significant delays in developmental tasks.  Early at crawling, 9 months when walking, turned 3 lost it all and was dx with autism. Patient's highest education level was other Home schooled.  Patient identified the following learning problems: none reported. Modifications will not be used to assist communication in therapy. " Patient reports they are  able to understand written materials.    Patient reported the following relationship history no  , did not date.  Patient's current relationship status is single for lifetime.   Patient identified their sexual orientation as heterosexual.  Patient reported having   0 child(chasidy). Patient identified siblings; pets; other as part of their support system.  Patient identified the quality of these relationships as good  .      Patient's current living/housing situation involves staying with someone.  The immediate members of family and household include Kerrie moody, 64,Grandmother and they report that housing is stable.    Patient is currently disabled. Patient reports their finances are obtained through SSDI disability. Patient does not identify finances as a current stressor.      Patient reported that they have not been involved with the legal system.    Patient does not report being under probation/ parole/ jurisdiction. They are not under any current court jurisdiction.     Patient's Strengths and Limitations:  Patient identified the following strengths or resources that will help them succeed in treatment: commitment to health and well being, family support, insight, intelligence, motivation and sense of humor. Things that may interfere with the patient's success in treatment include: social anxiety.     Assessments:  The following assessments were completed by patient for this visit:  PHQ9:       10/12/2022    12:59 PM 4/13/2023     1:04 PM 5/3/2023     8:37 AM   PHQ-9 SCORE   PHQ-9 Total Score MyChart 4 (Minimal depression) 1 (Minimal depression) 1 (Minimal depression)   PHQ-9 Total Score 4 1 1    1     GENERALIZED ANXIETY DISORDER SCALE  5/3/2023  Over the last 2 weeks, how often have you been bothered by the following problems?    1. Feeling nervous, anxious, or on edge - Several Days  2. Not being able to stop or control worrying - Several Days    ИРИНА 2 Total Score -  2    Developed by Katya Mckay, Jessie Jama, Simba Garcia and colleagues, with an educational levar from [employer] No permission required to reproduce, translate, display or distribute.    CAGE-AID:       5/3/2023     8:50 AM   CAGE-AID Total Score   Total Score 0    0   Total Score MyChart 0 (A total score of 2 or greater is considered clinically significant)     PROMIS 10-Global Health (only subscores and total score):       5/3/2023     8:50 AM   PROMIS-10 Scores Only   Global Mental Health Score 15    15   Global Physical Health Score 17    17   PROMIS TOTAL - SUBSCORES 32    32     Gunpowder Suicide Severity Rating Scale (Lifetime/Recent)      5/3/2023    10:24 AM   Gunpowder Suicide Severity Rating (Lifetime/Recent)   1. Wish to be Dead (Lifetime) N   2. Non-Specific Active Suicidal Thoughts (Lifetime) N   Actual Attempt (Lifetime) N   Has subject engaged in non-suicidal self-injurious behavior? (Lifetime) N   Interrupted Attempts (Lifetime) N   Aborted or Self-Interrupted Attempt (Lifetime) N   Preparatory Acts or Behavior (Lifetime) N   Calculated C-SSRS Risk Score (Lifetime/Recent) No Risk Indicated        Personal and Family Medical History:  Patient does not report a family history of mental health concerns.  Patient reports family history includes Anxiety Disorder in her maternal aunt and mother; Autism Spectrum Disorder in her sister; Back Pain in her mother; Bipolar Disorder in her mother; Deep Vein Thrombosis (DVT) in her mother; Endometriosis in her mother; Hypertension in her mother; No Known Problems in her father; Pulmonary Embolism in her mother; Scoliosis in her sister; Skin Cancer in her mother.     Patient does report Mental Health Diagnosis and/or Treatment.  Patient Patient reported the following previous diagnoses which include(s): Autism   .  Patient reported symptoms began as a child, anxiety since 2011.  Patient has not received mental health services in the past: none     .  Psychiatric Hospitalizations:     none.  Patient denies a history of civil commitment.  Currently, patient   is not receiving other mental health services.  These include none. Worked with PCP only.         Patient has had a physical exam to rule out medical causes for current symptoms.  Date of last physical exam was within the past year. Client was encouraged to follow up with PCP if symptoms were to develop. The patient has a [place] Primary Care Provider, who is named Ailyn Gonzales..  Patient reports the following current medical concerns: . use to have black out spells and went to a heart dr when younger and sent to a heart doctor and was told to eat more salt. Since eating more salt hasn't had any spells. Patient denies any issues with pain. There are not significant appetite / nutritional concerns / weight changes.  Patient does not report a history of head injury / trauma / cognitive impairment.      Patient reports not taking any current medications    Medication Adherence:  Patient reports not currently prescribed.      Patient Allergies:    Allergies   Allergen Reactions     Azithromycin Unknown     Influenza Virus Vaccine Whole [Influenza Vaccines] Rash     Red Dye Rash       Medical History:    Past Medical History:   Diagnosis Date     Anxiety      Autism spectrum disorder          Current Mental Status Exam:   Appearance:  Appropriate    Eye Contact:  Good   Psychomotor:  Normal       Gait / station:  no problem  Attitude / Demeanor: Cooperative  Interested Friendly  Speech      Rate / Production: Normal/ Responsive      Volume:  Normal  volume      Language:  intact  Mood:   Anxious   Affect:   Subdued  Worrisome    Thought Content: Clear   Thought Process: Coherent  Logical       Associations: No loosening of associations  Insight:   Fair   Judgment:  Intact   Orientation:  All  Attention/concentration: Good      Substance Use:  Patient did not report a family history of substance use  concerns; see medical history section for details. Patient has not received chemical dependency treatment in the past.  Patient has not ever been to detox.      Patient is not currently receiving any chemical dependency treatment.           Substance History of use Age of first use Date of last use     Pattern and duration of use (include amounts and frequency)   Alcohol never used       REPORTS SUBSTANCE USE: N/A   Cannabis   never used     REPORTS SUBSTANCE USE: N/A     Amphetamines   never used     REPORTS SUBSTANCE USE: N/A   Cocaine/crack    never used       REPORTS SUBSTANCE USE: N/A   Hallucinogens never used         REPORTS SUBSTANCE USE: N/A   Inhalants never used         REPORTS SUBSTANCE USE: N/A   Heroin never used         REPORTS SUBSTANCE USE: N/A   Other Opiates never used     REPORTS SUBSTANCE USE: N/A   Benzodiazepine   never used     REPORTS SUBSTANCE USE: N/A   Barbiturates never used     REPORTS SUBSTANCE USE: N/A   Over the counter meds never used     REPORTS SUBSTANCE USE: N/A   Caffeine currently use 5   2-3 per day    Nicotine  never used     REPORTS SUBSTANCE USE: N/A   Other substances not listed above:  Identify:  never used     REPORTS SUBSTANCE USE: N/A     Patient reported the following problems as a result of their substance use: no problems, not applicable.    Substance Use: No symptoms    Based on the negative CAGE score and clinical interview there  are not indications of drug or alcohol abuse.      Significant Losses / Trauma / Abuse / Neglect Issues:   Patient did not serve in the .  There are indications or report of significant loss, trauma, abuse or neglect issues related to: death of  mom last March and lost a couple of pets.    Concerns for possible neglect are not present.     Safety Assessment:   Patient denies current homicidal ideation and behaviors.  Patient denies current self-injurious ideation and behaviors.    Patient denied risk behaviors associated with  substance use.  Patient denies any high risk behaviors associated with mental health symptoms.  Patient reports the following current concerns for their personal safety: None.  Patient reports there are not firearms in the house.           History of Safety Concerns:  Patient denied a history of homicidal ideation.     Patient denied a history of personal safety concerns.    Patient denied a history of assaultive behaviors.    Patient denied a history of sexual assault behaviors.     Patient denied a history of risk behaviors associated with substance use.  Patient denies any history of high risk behaviors associated with mental health symptoms.  Patient reports the following protective factors: forward or future oriented thinking; dedication to family or friends; safe and stable environment; regular physical activity; sense of belonging; living with other people; daily obligations    Risk Plan:  See Recommendations for Safety and Risk Management Plan    Review of Symptoms per patient report:   Depression: No symptoms  Kami:  No Symptoms  try not do impulsive spending  Psychosis:    Have seen a shadow figure once, everyone heard something talking once  Anxiety: Excessive worry, Nervousness, Social anxiety, Ruminations, Poor concentration and taking melatonin to help with sleep- has been helpful, head aches and stomach  Panic:  Palpitations, Tremors, Shortness of breath, Tingling and Sense of impending doom, no triggers, will ride them out and will go to yawn   Post Traumatic Stress Disorder:  No Symptoms   Eating Disorder: fussy eater, related to textures and taste and smell, lost a little weight when last at the dr  ADD / ADHD:  Inattentive, Difficulties listening, Distractibility, Forgetful, Interrupts, Impulsive, Restlessness/fidgety, Hyperverbal and Hyperactive  Conduct Disorder: No symptoms  Autism Spectrum Disorder: Deficits in developing, maintaining, and understanding relationships, Highly restricted  fixated interests that are abnormal in intensity or focus and Hyper or hyporeacitivty to sensory input or unusual interest in sensory aspects ,  Can get along with people, don't have friends, highly fixated interests, able to roll with changes in schedule, use to put things in certain order and color    Obsessive Compulsive Disorder: Washing    Patient reports the following compulsive behaviors and treatment history: Hair Pulling - has not had treatment. Will run hands through hair, twirled it or knot it while anxious, doesn't pull hair, grandma notes that pt does, no bald spots ever      Diagnostic Criteria:   Generalized Anxiety Disorder  A. Excessive anxiety and worry about a number of events or activities (such as work or school performance).   B. The person finds it difficult to control the worry.  C. Select 3 or more symptoms (required for diagnosis). Only one item is required in children.   - Restlessness or feeling keyed up or on edge.    - Difficulty concentrating or mind going blank.    - Sleep disturbance (difficulty falling or staying asleep, or restless unsatisfying sleep).   D. The focus of the anxiety and worry is not confined to features of an Axis I disorder.  E. The anxiety, worry, or physical symptoms cause clinically significant distress or impairment in social, occupational, or other important areas of functioning.   F. The disturbance is not due to the direct physiological effects of a substance (e.g., a drug of abuse, a medication) or a general medical condition (e.g., hyperthyroidism) and does not occur exclusively during a Mood Disorder, a Psychotic Disorder, or a Pervasive Developmental Disorder. Autism Spectrum Disorder Without accompanying intellectual impairment. , A.  Persistent deficits in social communication and social interaction across multiple contexts as manifested by the following, currently or by history:, 1.  Deficits in social emotional reciprocity, ranging for example, from  abnormal social approach and failure of normal back and forth conversation; to reduced sharing of interests, emotions, or affect; to failure to initiate or respond to social interactions. , 3.  Deficits in developing, maintaining, and understanding relationships, ranging for example, from difficulties adjusting behavior to suit various social contexts; to difficulties in sharing imaginative play or in making friends; to absence of interest in peers. , B.  Restricted, repetitive patterns of behavior, interests, or activities, as manifested by at least two of the following, currently or by history:, 1.  Stereotypes or repetitive motor movements, use of objects, or speech (e.g., simple motor stereotypes, lining up of toys or flipping objects, echolalia, idiosyncratic phrases)., 3.  Highly restricted, fixated interests that are abnormal in intensity or focus. , 4.  Hyper- or hypo-reactivity to sensory input or unusual interest in sensory aspects of the environment., C.  Symptoms are/were present in the early developmental period., D.  Symptoms cause clinically significant impairment in social, occupational, or other important areas of current functioning. , E.  These disturbances are not better explained by intellectual disability (Intellectual developmental disorder) or global developmental delay.      Pt has historical dx of Autism.     Functional Status:  Patient reports the following functional impairments:  social interactions.     Nonprogrammatic care:  Patient is requesting basic services to address current mental health concerns.    Clinical Summary:  1. Reason for assessment:  anxiety.  2. Psychosocial, Cultural and Contextual Factors:  and , lives with grsndma and aunt, mom passed away last March, has trouble in social situations, use to line things up in certain order by color, not working, and single all of life, get along with family.  3. Principal DSM5 Diagnoses  (Sustained by DSM5  Criteria Listed Above):   Autism Spectrum Disorder 299.00(F84.0)  Associated with another neurodevelopmental, mental or behavioral disorder  300.02 (F41.1) Generalized Anxiety Disorder.  4. Other Diagnoses that is relevant to services:   Will pass out from low sodium.  5. Provisional Diagnosis:  Autism Spectrum Disorder 299.00(F84.0)  Associated with another neurodevelopmental, mental or behavioral disorder  300.02 (F41.1) Generalized Anxiety Disorder as evidenced by PHQ9, GAD7, chart review and clinical interview.  6. Prognosis: Expect Improvement.  7. Likely consequences of symptoms if not treated: worsening mental health.  8. Client strengths include:  committed to sobriety, educated, intelligent, motivated and support of family, friends and providers .     Recommendations:     1. Plan for Safety and Risk Management:   Safety and Risk: Recommended that patient call 911 or go to the local ED should there be a change in any of these risk factors.          Report to child / adult protection services was NA.     2. Patient's identified mental health concerns with a cultural influence will be addressed by CCPS staff.     3. Initial Treatment will focus on:    Anxiety - worry, panic attacks, trouble focusing, ruminations and social anxiety .     4. Resources/Service Plan:    services are not indicated.   Modifications to assist communication are not indicated.   Additional disability accommodations are not indicated.      5. Collaboration:   Collaboration / coordination of treatment will be initiated with the following support professionals:   Pooja Can, APRN, CNP, PNP-PC, PMHNP-BC.      6.  Referrals:   The following referral(s) will be initiated: TBD. Next Scheduled Appointment: TBD.      A Release of Information has been obtained for the following: N/A.     Emergency Contact was obtained.      Clinical Substantiation/medical necessity for the above recommendations:  ..    7. ALESSANDRO:    ALESSANDRO:  Discussed the  general effects of drugs and alcohol on health and well-being. Provider gave patient printed information about the effects of chemical use on their health and well being. Recommendations:  Continue no use.     8. Records:   These were reviewed at time of assessment.   Information in this assessment was obtained from the medical record and provided by patient and family who is a good historian.    Patient will have open access to their mental health medical record.    9.   Interactive Complexity: No      Provider Name/ Credentials:  CB Pool, KASHIF [facility]  May 3, 2023

## 2023-05-03 NOTE — Clinical Note
Please call patient to schedule a follow-up appointment with myself and [facility] [place] CB Pool, LICSIMONA in 4 weeks.  Kerrie is her legal guardian - please call her to schedule followup appointment at the primary number on file.  Thank you,   Pooja Can, APRN, CNP, PNP-PC, PMHNP-BC  [facility] ([facility])

## 2023-05-03 NOTE — PATIENT INSTRUCTIONS
PLAN   CONTINUE hydroxyzine 25 mg p.r.n. for anxiety.   Referred for psychiatric MTM consultation.  Question whether we should try SSRI again but utilize compounding pharmacy to achieve lower doses versus other recommendations? Has not tolerated 3 past SSRI trials due to side effects at low doses.  All were discontinued within about 1 week.   Refer for neuropsych eval to help us better conceptualize her cognitive strengths and weaknesses to further guide treatment planning. If you do not hear from us within 2 business days, please call [phone] to schedule.   Follow up with CCPS 1 month.   Avoid mood-altering substances not prescribed to you.   Please contact me via [employer] with any non-urgent concerns or call [phone].   Call or text [phone] for mental health crisis.   Call [phone] or use ER for potentially life-threatening situations.     Patient Education   Collaborative Care Psychiatry Service  What to Expect  Here's what to expect from your Collaborative Care Psychiatry Service (CCPS).   About CCPS  CCPS means 2 people work together to help you get better. You'll meet with a behavioral health clinician and a psychiatric doctor. A behavioral health clinician helps people with mental health problems by talking with them. A psychiatric doctor helps people by giving them medicine.  How it works  At every visit, you'll see the behavioral health clinician (BHC) first. They'll talk with you about how you're doing and teach you how to feel better.   Then you'll see the psychiatric doctor. This doctor can help you deal with troubling thoughts and feelings by giving you medicine. They'll make sure you know the plan for your care.   CCPS usually takes 3 to 6 visits. If you need more visits, we may have you start seeing a different psychiatric doctor for ongoing care.  If you have any questions or concerns, we'll be glad to talk with you.  About visits  Be open  At your visits, please talk openly about your problems. It  "may feel hard, but it's the best way for us to help you.  Cancelling visits  If you can't come to your visit, please call us right away at [phone]. If you don't cancel at least 24 hours (1 full day) before your visit, that's \"late cancellation.\"  Being late to visits  Being very late is the same as not showing up. You will be a \"no show\" if:  Your appointment starts with a C, and you're more than 15 minutes late for a 30-minute (half hour) visit. This will also cancel your appointment with the psychiatric doctor.  Your appointment is with a psychiatric doctor only, and you're more than 15 minutes late for a 30-minute (half hour) visit.  Your appointment is with a psychiatric doctor only, and you're more than 30 minutes late for a 60-minute (full hour) visit.  If you cancel late or don't show up 2 times within 6 months, we may end your care.   Getting help between visits  If you need help between visits, you can call us Monday to Friday from 8 a.m. to 4:30 p.m. at [phone].  Emergency care  Call 911 or go to the nearest emergency department if your life or someone else's life is in danger.  Call 988 anytime to reach the national Suicide and Crisis hotline.  Medicine refills  To refill your medicine, call your pharmacy. You can also call [facility]'s Behavioral Access at [phone], Monday to Friday, 8 a.m. to 4:30 p.m. It can take 1 to 3 business days to get a refill.   Forms, letters, and tests  You may have papers to fill out, like FMLA, short-term disability, and workability. We can help you with these forms at your visits, but you must have an appointment. You may need more than 1 visit for this, to be in an intensive therapy program, or both.  Before we can give you medicine for ADHD, we may refer you to get tested for it or confirm it another way.  We may not be able to give you an emotional support animal letter.  We don't do mental health checks ordered by the court.   We don't do " mental health testing, but we can refer you to get tested.   Thank you for choosing us for your care.  For informational purposes only. Not to replace the advice of your health care provider. Copyright   2022 [facility]. All rights reserved. [employer] [id number] - 12/22.

## 2023-05-03 NOTE — NURSING NOTE
Is the patient currently in the state of [place]? YES    Visit mode:VIDEO    If the visit is dropped, the patient can be reconnected by: VIDEO VISIT: Text to cell phone: [phone]    Will anyone else be joining the visit? YES: How would they like to receive their invitation? Text to cell phone: [phone]      How would you like to obtain your AVS? MyChart    Are changes needed to the allergy or medication list? NO    Reason for visit: Video Visit    Care team has reviewed attendance agreement with patient. Patient advised that two failed appointments within 6 months may lead to termination of current episode of care.      Michaela Weems VF

## 2023-05-03 NOTE — Clinical Note
Thank you for referring Dea Garcia to CCPS.  I will let you know once Dea is dismissed from Collaborative Care Psychiatry Service (CCPS) back to your care from a mental health standpoint.    Gratefully,   Pooja Can, APRN, CNP, PNP-PC, PMHNP-BC  Collaborative Care Psychiatry Service (CCPS)

## 2023-05-04 ENCOUNTER — TELEPHONE (OUTPATIENT)
Dept: PSYCHIATRY | Facility: CLINIC | Age: 23
End: 2023-05-04
Payer: COMMERCIAL

## 2023-05-04 NOTE — TELEPHONE ENCOUNTER
Attempted to call Kerrie and left voicemail to call back.   Per Pooja:   After our visit yesterday, we planned to keep Dea on hydroxyzine as needed for anxiety, as this has been helpful.  Before adding on a daily medication for anxiety, we agreed she would meet with the psych pharmacist for MTM consultation to review options, as she has tried 3 SSRIs but could not tolerate them longer than a week due to side effects with each one.     I just received a message that paddy Sy told the MTM  that Dea is already on the medication that works.  Will you just call and double check to recap and make sure I am understanding correctly before I complete this task?

## 2023-05-04 NOTE — TELEPHONE ENCOUNTER
Spoke to Grandmother Kerrie. She did not understand what kind of medication the review was for. She said the patient was on hydroxyzine and that worked fine. Explained that we wanted to find a daily medication to help with her anxiety. She kept saying that the patient took a Lorazepam once and that really helped. I explained that we don't like to prescribe medications like that too often. I did go over the reasoning of trying a selective serotonin reuptake inhibitor.   She finally agreed to to meet with the MTM. If you can notify them to call her again she prefers tomorrow after 1000am     Kelly Bae RN on 5/4/2023 at 4:02 PM

## 2023-05-04 NOTE — TELEPHONE ENCOUNTER
MTM referral from: [facility] visit (referral by provider)    MTM referral outreach attempt #1 on May 4, 2023 at 11:15 AM      Outcome: Patient is not interested at this time because per paddy Malin told provider what med worked , will route to MTM Pharmacist/Provider as an FYI. Thank you for the referral.     Nancy Ludwig - [facility]

## 2023-05-05 NOTE — TELEPHONE ENCOUNTER
Will hold off on MTM consult and review and at followup visit, as there is conflicting information provided since our visit.     Pooja Can APRN, CNP, PNP-PC, PMHNP-BC   Collaborative Care Psychiatry Service (CCPS)

## 2023-05-07 ENCOUNTER — HEALTH MAINTENANCE LETTER (OUTPATIENT)
Age: 23
End: 2023-05-07

## 2023-06-07 ENCOUNTER — VIRTUAL VISIT (OUTPATIENT)
Dept: PSYCHIATRY | Facility: CLINIC | Age: 23
End: 2023-06-07
Payer: COMMERCIAL

## 2023-06-07 ENCOUNTER — VIRTUAL VISIT (OUTPATIENT)
Dept: BEHAVIORAL HEALTH | Facility: CLINIC | Age: 23
End: 2023-06-07
Payer: COMMERCIAL

## 2023-06-07 DIAGNOSIS — F41.9 ANXIETY: ICD-10-CM

## 2023-06-07 DIAGNOSIS — F84.0 AUTISM SPECTRUM DISORDER: Primary | ICD-10-CM

## 2023-06-07 DIAGNOSIS — F84.0 AUTISM SPECTRUM DISORDER: ICD-10-CM

## 2023-06-07 DIAGNOSIS — F41.1 GENERALIZED ANXIETY DISORDER: Primary | ICD-10-CM

## 2023-06-07 DIAGNOSIS — Z30.9 ENCOUNTER FOR CONTRACEPTIVE MANAGEMENT, UNSPECIFIED TYPE: ICD-10-CM

## 2023-06-07 PROCEDURE — 90832 PSYTX W PT 30 MINUTES: CPT | Mod: VID | Performed by: COUNSELOR

## 2023-06-07 PROCEDURE — 99214 OFFICE O/P EST MOD 30 MIN: CPT | Mod: VID | Performed by: NURSE PRACTITIONER

## 2023-06-07 NOTE — TELEPHONE ENCOUNTER
Patient now followed with provider at [facility].    Forwarded to their refill pool.    Geraldine Chapin RN on 6/7/2023 at 4:41 PM

## 2023-06-07 NOTE — PROGRESS NOTES
"Virtual Visit Details  Type of service:  Video Visit   Video Start Time: 2:02 PM  Video End Time:  2:15 PM  Originating Location (pt. Location): Home    Distant Location (provider location):  Off-site  Platform used for Video Visit: [facility]  [address]  [place]  [address]   [phone]     COLLABORATIVE CARE PSYCHIATRY SERVICE  PROGRESS NOTE    CHIEF COMPLAINT  Follow up on anxiety.    HISTORY OF PRESENT ILLNESS  Dea is a 22-year-old female who presents with her maternal grandmother and maternal aunt.  Taking hydroxyzine 25 mg once every day.  Doesn't use more than that.  Denies s/e such as dry mouth, constipation, or fatigue. Denies any SI or self harm. Describes mood as \"easy going.\" Going to bed around midnight and falling asleep in under 30 minutes.  Waking up around 8-9 a.m. Wakes to use the bathroom most nights about 1x/night. Denies napping during the day. During the day, helps grandma around the house, which she enjoys.  Tells grandma she is too old to do things by herself (grandma is 65).     They feel this medication is a good fit for managing her anxiety and no longer interested in MTM referral.     FAMILY HISTORY, PSYCHIATRIC HISTORY, SOCIAL HISTORY  Pertinent changes since previous visit:  Denied.  Not in college. Plans for summer: \"Staying cool. Trying not to have the power go out.\" (Grandma explains that meters were changed through their power company, so the power goes about once a week now.)     PAST MEDICAL AND SURGICAL HISTORY  Pertinent changes since previous visit:  denied    ALLERGIES  Allergies   Allergen Reactions     Azithromycin Unknown     Influenza Virus Vaccine Whole [Influenza Vaccines] Rash     Red Dye Rash     Per family, she cannot touch red dye but can consume it.     CURRENT MEDICATION  Outpatient Medications Prior to Visit   Medication Sig Dispense Refill     b complex vitamins " "tablet [B COMPLEX VITAMINS TABLET] Take 1 tablet by mouth daily. 30 tablet 12     cetirizine (ZYRTEC) 10 MG tablet Take 1 tablet (10 mg) by mouth daily 90 tablet 3     medroxyPROGESTERone (DEPO-PROVERA) 150 MG/ML syringe Inject 1 mL (150 mg) into the muscle every 3 months 1 mL 0     Vitamin D3 (CHOLECALCIFEROL) 25 mcg (1000 units) tablet Take by mouth daily       hydrOXYzine (ATARAX) 25 MG tablet Take 1 tablet (25 mg) by mouth 3 times daily as needed for anxiety (Patient not taking: Reported on 6/7/2023) 30 tablet 3     No facility-administered medications prior to visit.      PDMP Review     None         PAST PSYCHOTROPIC MEDICATION  Lexapro 5 mg - 1 dose. Low blood pressure, faint, pale.   Prozac (fluoxetine) 10 mg - nausea, stoped taking. Tongue was white, mouth was dry.   Zoloft (sertraline) 25 mg x1 week, then 50 mg -  Pale, lightheaded. Went away after she stopped taking it after about a week.     MENTAL STATUS EXAM  Vital signs:  Deferred due to virtual visit.  Appearance:  Appropriate attire.  Alert.  Hair slightly unkempt but otherwise no apparent concerns with hygiene. No abnormal movements. Sitting on couch between grandma and aunt. Missing a front tooth. No fidgeting or restlessness. Does not appear to be in any distress.  Behavior:  Cooperative, calm, kind.   Eye contact:  Good.  Gait and motor coordination:  Unable to assess via video. No concerns identified by report.  Speech:  Soft spoken but otherwise appears to be normal.  Often looks to grandma for reassurance or to assist with answers to questions.    Attention and concentration:  Excellent  Mood:  \"easy going\"  Affect:  Mood congruent.    Orientation:  x4  Thought process:  Organized  Thought content:  Reality based.  Does not appear to be responding to internal stimuli.   Recent and remote memory:  No impairment.   Estimate of intelligence:  Low average  Insight:  Developmentally appropriate  Judgement:  Developmentally appropriate  Long- and " short-term memory:  Intact.  Suicidal ideation:  denies    DIAGNOSTIC IMPRESSION   Autism spectrum disorder, F84.0  Anxiety, F41.9     Differential diagnoses:  Rule out intellectual impairment    FORMULATION  No medication changes desired.  They feel the hydroxyzine 25 mg once daily is effective for anxiety and desire no changes. Did not tolerate SSRIs in the past. No acute safety concerns.  No longer interested in MTM consultation at this time. Will dismiss back to PCP for ongoing management, which pt and grandma felt comfortable with today.  Advised them to followup with PCP before needing future refills.  For a more comprehensive formulation, refer to initial CCPS assessment dated 5/3/23.     PLAN    Continue hydroxyzine 25 mg up to 3 times daily as needed for anxiety (currently taking once a day).     Dismissed from Collaborative Care Psychiatry Service (CCPS) back to primary care provider.     Follow up with primary care provider before needing future refills. No refills needed today, and they have an upcoming appointment with PCP (Ailyn Gonzales, APRN, CNP) on 6/23/23.      Avoid mood-altering substances not prescribed to you.     Please contact me via [employer] with any non-urgent concerns or call [phone].     Call or text [phone] for mental health crisis.     Call 911 or use ER for potentially life-threatening situations.       Patient status:  At this time, pt will return to primary care provider for ongoing management.   Informed consent and counseling:  Reviewed Informed Consent and Counseling: common side effects, treatment compliance, coordination of care with other clinicians and medication education     BILLING NOTE:   Level of Medical Decision Making:   - At least 2 stable chronic problems  - Engaged in prescription drug management during visit (discussed any medication benefits, side effects, alternatives, etc.)  - Diagnosis or treatment significantly limited by social determinants of health         Pooja Can, APRN, CNP, PNP-PC, PMHNP-BC   [facility] Psychiatry Service [facility]    Speech recognition software may be used to create portions of this document.  Attempts at proofreading have been made to minimize errors, though some may remain.

## 2023-06-07 NOTE — NURSING NOTE
Is the patient currently in the state of [place]? YES    Visit mode:VIDEO    If the visit is dropped, the patient can be reconnected by: VIDEO VISIT: Text to cell phone: [phone]    Will anyone else be joining the visit? YES: How would they like to receive their invitation? Text to cell phone: NA      How would you like to obtain your AVS? MyChart    Are changes needed to the allergy or medication list? NO    Reason for visit: RECHECK    Michaela Weems VF

## 2023-06-07 NOTE — PROGRESS NOTES
[facility] Psychiatry Services [facility]  June 7, 2023      Behavioral Health Clinician Progress Note    Patient Name: Dea Garcia           Service Type:  Individual      Service Location:   [facility] / Email (patient reached)     Session Start Time: 139pm  Session End Time: 159pm      Session Length: 16 - 37      Attendees: Patient and Aunt and Grandma legal guardian     Service Modality:  Video Visit:      Provider verified identity through the following two step process.  Patient provided:  Patient is known previously to provider and Patient was verified at admission/transfer    Telemedicine Visit: The patient's condition can be safely assessed and treated via synchronous audio and visual telemedicine encounter.      Reason for Telemedicine Visit: Services only offered telehealth    Originating Site (Patient Location): Patient's home    Distant Site (Provider Location): Provider Remote Setting- Home Office    Consent:  The patient/guardian has verbally consented to: the potential risks and benefits of telemedicine (video visit) versus in person care; bill my insurance or make self-payment for services provided; and responsibility for payment of non-covered services.     Patient would like the video invitation sent by:  My Chart    Mode of Communication:  Video Conference via [facility]    Distant Location (Provider):  Off-site    As the provider I attest to compliance with applicable laws and regulations related to telemedicine.    Visit Activities (Refresh list every visit): [facility] Only    Diagnostic Assessment Date: 5/3/2023  Treatment Plan Review Date: in the next few meetings  See Flowsheets for today's PHQ-9 and ИРИНА-7 results  Previous PHQ-9:     10/12/2022    12:59 PM 4/13/2023     1:04 PM 5/3/2023     8:37 AM   PHQ-9 SCORE   PHQ-9 Total Score [facility] 4 (Minimal depression) 1 (Minimal depression) 1 (Minimal depression)   PHQ-9 Total Score 4 1 1    1     Previous ИРИНА-7:         View : No data to display.                OMER LEVEL:      10/12/2022     1:19 PM   OMER Score (Last Two)   OMER Raw Score 30   Activation Score 56   OMER Level 3       DATA  Extended Session (60+ minutes): No  Interactive Complexity: No  Crisis: No  [facility] Patient: No    Treatment Objective(s) Addressed in This Session:  Target Behavior(s): ways to cope with panic attacks, cope with being in social settings    Anxiety: will experience a reduction in anxiety, will develop more effective coping skills to manage anxiety symptoms and will increase ability to function adaptively    Current Stressors / Issues:   update: Pt says that things are a little bit better. Pt is still having panic attacks. There aren't having as many difficulties with breathing. Pt had then for 10-30 minutes. Pt has had a change to be outside more. Pt has a couple of panic attacks a week and rode them out. Pt has been going shopping with their grandma and aunt.   Stressors: no    Sleep: no change     Caffeine: no change   Interventions: [facility] recommends pt use distractions (fidgets, hair ties) and square breathing to help manage anxiety and make it easier for the body to go back to a calm state when practiced outside of anxious times or panic attacks.  Medication Questions/Requests:       Progress on Treatment Objective(s) / Homework:  Minimal progress - ACTION (Actively working towards change); Intervened by reinforcing change plan / affirming steps taken    CBT: [facility] recommends pt use distractions (fidgets, hair ties) and square breathing to help manage anxiety and make it easier for the body to go back to a calm state when practiced outside of anxious times or panic attacks.    Motivational Interviewing    MI Intervention: Co-Developed Goal: manage panic attacks, Expressed Empathy/Understanding, Supported Autonomy, Collaboration, Evocation, Permission to raise concern or advise, Open-ended questions, Reflections: simple and complex, Change talk  (evoked) and Reframe     Change Talk Expressed by the Patient: Need to change Committment to change Taking steps    Provider Response to Change Talk: E - Evoked more info from patient about behavior change, A - Affirmed patient's thoughts, decisions, or attempts at behavior change, R - Reflected patient's change talk and S - Summarized patient's change talk statements    Also provided psychoeducation about behavioral health condition, symptoms, and treatment options    Care Plan review completed: No    Medication Review:  No changes to current psychiatric medication(s)    Medication Compliance:  Yes    Changes in Health Issues:   None reported    Chemical Use Review:   Substance Use: Chemical use reviewed, no active concerns identified      Tobacco Use: No current tobacco use.      Assessment: Current Emotional / Mental Status (status of significant symptoms):  Risk status (Self / Other harm or suicidal ideation)  Patient denies a history of suicidal ideation, suicide attempts, self-injurious behavior, homicidal ideation, homicidal behavior and and other safety concerns  Patient denies current fears or concerns for personal safety.  Patient denies current or recent suicidal ideation or behaviors.  Patient denies current or recent homicidal ideation or behaviors.  Patient denies current or recent self injurious behavior or ideation.  Patient denies other safety concerns.  A safety and risk management plan has not been developed at this time, however patient was encouraged to call [place] [phone] should there be a change in any of these risk factors.    Appearance:   Appropriate   Eye Contact:   Good   Psychomotor Behavior: Normal   Attitude:   Cooperative  Pleasant  Orientation:   All  Speech   Rate / Production: Normal    Volume:  Normal   Mood:    Anxious   Affect:    Subdued   Thought Content:  Clear   Thought Form:  Coherent  Logical   Insight:    Fair     Diagnoses:  1. Generalized anxiety disorder    2.  Autism spectrum disorder        Collateral Reports Completed:  Communicated with:   Pooja Can, APRN, CNP, PNP-PC, PMHNP-BC     Plan: (Homework, other):  Patient was given information about behavioral services and encouraged to schedule a follow up appointment with the clinic [facility] in 1 month.  She was also given information about mental health symptoms and treatment options .  CD Recommendations: No indications of CD issues. [facility] encourages pt to use square breathing to cope with panic attacks and to practice this outside of a panic attack to teach the brain to do this automatically when in a panic.     Gosia Hilliard, MSW, [facility] 6/7/2023      ______________________________________________________________________    Integrated Primary Care Behavioral Health Treatment Plan    Patient's Name: Dea Garcia  YOB: 2000    Date of Creation: in the next few meetings  Date Treatment Plan Last Reviewed/Revised:  in the next few meetings    DSM5 Diagnoses:   1. Generalized anxiety disorder    2. Autism spectrum disorder      Psychosocial / Contextual Factors:  and , lives with grandma and aunt, mom passed away last March, has trouble in social situations, use to line things up in certain order by color, not working, and single all of life, get along with family  PROMIS (reviewed every 90 days):        5/3/2023     8:50 AM   PROMIS-10 Scores Only   Global Mental Health Score 15    15   Global Physical Health Score 17    17   PROMIS TOTAL - SUBSCORES 32    32          Referral / Collaboration:  Referral to another professional/service is not indicated at this time.    Anticipated number of session for this episode of care: 4-5  Anticipation frequency of session: Monthly  Anticipated Duration of each session: 16-37 minutes  Treatment plan will be reviewed in 90 days or when goals have been changed.       Patient and Parent / Guardian has reviewed and agreed to the above  plan.      Gosia Hilliard, [facility]  June 7, 2023

## 2023-06-07 NOTE — PATIENT INSTRUCTIONS
PLAN  Continue hydroxyzine 25 mg up to 3 times daily as needed for anxiety (currently taking once a day).   Dismissed from Collaborative Care Psychiatry Service (CCPS) back to primary care provider.   Follow up with primary care provider before needing future refills. No refills needed today, and they have an upcoming appointment with PCP (Ailyn Gonzales, APRN, CNP) on 6/23/23.    Avoid mood-altering substances not prescribed to you.   Please contact me via [employer] with any non-urgent concerns or call [phone].   Call or text [phone] for mental health crisis.   Call [phone] or use ER for potentially life-threatening situations.         Patient Education   Collaborative Care Psychiatry Service  What to Expect  Here's what to expect from your Collaborative Care Psychiatry Service (CCPS).   About CCPS  CCPS means 2 people work together to help you get better. You'll meet with a behavioral health clinician and a psychiatric doctor. A behavioral health clinician helps people with mental health problems by talking with them. A psychiatric doctor helps people by giving them medicine.  How it works  At every visit, you'll see the behavioral health clinician (BHC) first. They'll talk with you about how you're doing and teach you how to feel better.   Then you'll see the psychiatric doctor. This doctor can help you deal with troubling thoughts and feelings by giving you medicine. They'll make sure you know the plan for your care.   CCPS usually takes 3 to 6 visits. If you need more visits, we may have you start seeing a different psychiatric doctor for ongoing care.  If you have any questions or concerns, we'll be glad to talk with you.  About visits  Be open  At your visits, please talk openly about your problems. It may feel hard, but it's the best way for us to help you.  Cancelling visits  If you can't come to your visit, please call us right away at [phone]. If you don't cancel at least 24 hours (1 full day) before  "your visit, that's \"late cancellation.\"  Being late to visits  Being very late is the same as not showing up. You will be a \"no show\" if:  Your appointment starts with a C, and you're more than 15 minutes late for a 30-minute (half hour) visit. This will also cancel your appointment with the psychiatric doctor.  Your appointment is with a psychiatric doctor only, and you're more than 15 minutes late for a 30-minute (half hour) visit.  Your appointment is with a psychiatric doctor only, and you're more than 30 minutes late for a 60-minute (full hour) visit.  If you cancel late or don't show up 2 times within 6 months, we may end your care.   Getting help between visits  If you need help between visits, you can call us Monday to Friday from 8 a.m. to 4:30 p.m. at [phone].  Emergency care  Call 911 or go to the nearest emergency department if your life or someone else's life is in danger.  Call 988 anytime to reach the [facility] Suicide and Crisis hotline.  Medicine refills  To refill your medicine, call your pharmacy. You can also call [facility]'s Behavioral Access at [phone], Monday to Friday, 8 a.m. to 4:30 p.m. It can take 1 to 3 business days to get a refill.   Forms, letters, and tests  You may have papers to fill out, like FMLA, short-term disability, and workability. We can help you with these forms at your visits, but you must have an appointment. You may need more than 1 visit for this, to be in an intensive therapy program, or both.  Before we can give you medicine for ADHD, we may refer you to get tested for it or confirm it another way.  We may not be able to give you an emotional support animal letter.  We don't do mental health checks ordered by the court.   We don't do mental health testing, but we can refer you to get tested.   Thank you for choosing us for your care.  For informational purposes only. Not to replace the advice of your health care provider. Copyright   2022 " [facility]. All rights reserved. [employer] [id number] - 12/22.

## 2023-06-07 NOTE — Clinical Note
Fabian Robertson,  Pt has been dismissed from Collaborative Care Psychiatry Service (CCPS) due to completion of services. No med refills provided today, as she is scheduled with you on 6/23/23.  Pt was advised to follow up with you for med check prior to future refills. Pt is welcome to return to Collaborative Care Psychiatry Service (CCPS) with a new referral if needing further medication optimization.  Thank you for the opportunity to participate in your patient's care.   Pooja Can, APRN, CNP, PNP-PC, PMHNP-BC  Collaborative Care Psychiatry Service (CCPS)

## 2023-06-09 RX ORDER — MEDROXYPROGESTERONE ACETATE 150 MG/ML
INJECTION, SUSPENSION INTRAMUSCULAR
Qty: 1 ML | Refills: 0 | Status: SHIPPED | OUTPATIENT
Start: 2023-06-09 | End: 2023-06-23

## 2023-06-09 NOTE — TELEPHONE ENCOUNTER
Medication is being filled for 1 time refill only due to:  Patient needs to be seen because it has been more than one year since last visit.     Next 5 appointments (look out 90 days)    Jun 23, 2023  1:30 PM  (Arrive by 1:10 PM)  Adult Preventative Visit with DARA August CNP  [facility] ([facility] [place] ) [address] [place] [id number] [phone]        Alie ROLLINS RN, BSN

## 2023-06-23 ENCOUNTER — OFFICE VISIT (OUTPATIENT)
Dept: PEDIATRICS | Facility: CLINIC | Age: 23
End: 2023-06-23
Payer: COMMERCIAL

## 2023-06-23 VITALS
HEART RATE: 112 BPM | RESPIRATION RATE: 16 BRPM | BODY MASS INDEX: 25.44 KG/M2 | DIASTOLIC BLOOD PRESSURE: 70 MMHG | HEIGHT: 64 IN | WEIGHT: 149 LBS | OXYGEN SATURATION: 99 % | SYSTOLIC BLOOD PRESSURE: 108 MMHG | TEMPERATURE: 99.4 F

## 2023-06-23 DIAGNOSIS — Z30.9 ENCOUNTER FOR CONTRACEPTIVE MANAGEMENT, UNSPECIFIED TYPE: ICD-10-CM

## 2023-06-23 DIAGNOSIS — F84.0 AUTISM SPECTRUM DISORDER: ICD-10-CM

## 2023-06-23 DIAGNOSIS — F41.1 GENERALIZED ANXIETY DISORDER: ICD-10-CM

## 2023-06-23 DIAGNOSIS — Z00.00 ROUTINE GENERAL MEDICAL EXAMINATION AT A HEALTH CARE FACILITY: Primary | ICD-10-CM

## 2023-06-23 PROCEDURE — 99395 PREV VISIT EST AGE 18-39: CPT | Performed by: NURSE PRACTITIONER

## 2023-06-23 RX ORDER — MEDROXYPROGESTERONE ACETATE 150 MG/ML
INJECTION, SUSPENSION INTRAMUSCULAR
Qty: 1 ML | Refills: 3 | OUTPATIENT
Start: 2023-06-23 | End: 2023-09-15

## 2023-06-23 ASSESSMENT — ENCOUNTER SYMPTOMS
DIZZINESS: 0
HEARTBURN: 0
ARTHRALGIAS: 0
HEMATOCHEZIA: 0
SHORTNESS OF BREATH: 0
FEVER: 0
HEMATURIA: 0
ABDOMINAL PAIN: 0
DIARRHEA: 0
PALPITATIONS: 0
HEADACHES: 0
EYE PAIN: 0
DYSURIA: 0
JOINT SWELLING: 0
NAUSEA: 0
COUGH: 0
CONSTIPATION: 0
NERVOUS/ANXIOUS: 0
MYALGIAS: 0
FREQUENCY: 0
CHILLS: 0
WEAKNESS: 0
PARESTHESIAS: 0
SORE THROAT: 0

## 2023-06-23 ASSESSMENT — PAIN SCALES - GENERAL: PAINLEVEL: NO PAIN (0)

## 2023-06-23 NOTE — PROGRESS NOTES
SUBJECTIVE:   CC: Dea is an 22 year old who presents for preventive health visit.       6/23/2023     1:19 PM   Additional Questions   Roomed by Aliza OROZCO   Accompanied by Aunt         6/23/2023     1:19 PM   Patient Reported Additional Medications   Patient reports taking the following new medications n/a     Healthy Habits:    Getting at least 3 servings of Calcium per day:  Yes    Bi-annual eye exam:  Yes    Dental care twice a year:  NO    Sleep apnea or symptoms of sleep apnea:  None    Diet:  Regular (no restrictions)    Frequency of exercise:  4-5 days/week    Duration of exercise:  45-60 minutes    Taking medications regularly:  Yes    Medication side effects:  None    PHQ-2 Total Score:    Additional concerns today:  No           Social History     Tobacco Use     Smoking status: Never     Passive exposure: Yes     Smokeless tobacco: Never   Substance Use Topics     Alcohol use: Yes     Comment: Wine occasionally, 1 glass every few months.           6/23/2023     1:12 PM   Alcohol Use   Prescreen: >3 drinks/day or >7 drinks/week? Not Applicable     Reviewed orders with patient.  Reviewed health maintenance and updated orders accordingly - Yes  BP Readings from Last 3 Encounters:   06/23/23 108/70   04/13/23 112/74   02/27/23 104/70    Wt Readings from Last 3 Encounters:   06/23/23 67.6 kg (149 lb)   04/13/23 67.1 kg (148 lb)   02/27/23 68.9 kg (152 lb)           Breast Cancer Screening: Screening for breast cancer not routinely recommended with age <40.     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     Past Surgical History:   Procedure Laterality Date     NO HISTORY OF SURGERY       Family History   Problem Relation Age of Onset     Anxiety Disorder Mother      Deep Vein Thrombosis (DVT) Mother      Pulmonary Embolism Mother      Endometriosis Mother      Bipolar Disorder Mother      Back Pain Mother      Skin Cancer Mother      Hypertension Mother      Substance Abuse Mother         Abuse of rx Percocet and Adderall     Fibromyalgia Mother      Cardiac Sudden Death Mother         age 40     No Known Problems Father      Scoliosis Sister      Autism Spectrum Disorder Sister      Bipolar Disorder Maternal Grandmother      Alcoholism Maternal Grandmother         sober since 1989     Alcoholism Maternal Grandfather      Anxiety Disorder Maternal Aunt      Depression Maternal Aunt      Suicide Other         murder-suicide     Depression Maternal Great-Grandfather      Alcoholism Maternal Great-Grandfather      Depression Maternal Great-Grandmother      Breast Cancer Maternal Great-Grandmother      Social History     Socioeconomic History     Marital status: Single     Spouse name: Not on file     Number of children: Not on file     Years of education: Not on file     Highest education level: Not on file   Occupational History     Not on file   Tobacco Use     Smoking status: Never     Passive exposure: Yes     Smokeless tobacco: Never   Vaping Use     Vaping Use: Never used   Substance and Sexual Activity     Alcohol use: Yes     Comment: Wine occasionally, 1 glass every few months.     Drug use: No     Sexual activity: Not Currently     Partners: Female, Male     Birth control/protection: Injection    Other Topics Concern     Not on file   Social History Narrative    Does not work, is not in school.    Completed 6th grade, home schooled.     Mother passed away last year. Father not in the picture for many years.     Helps her grandmother out around the house.     Lives with her grandmother and aunt and sister (age 17) in [place].    Free time: playing on tablet, making bracelets.     Would like to move eventually.         Ailyn Gonzales, DNP, APRN, CNP    04/13/23     Social Determinants of Health     Financial Resource Strain: Low Risk  (10/12/2022)    Overall Financial Resource Strain (CARDIA)      Difficulty of Paying Living Expenses: Not very hard   Food Insecurity: No Food Insecurity (10/12/2022)    Hunger Vital Sign      Worried About Running Out of Food in the Last Year: Never true      Ran Out of Food in the Last Year: Never true   Transportation Needs: Unknown (10/12/2022)    PRAPARE - Transportation      Lack of Transportation (Medical): Patient refused      Lack of Transportation (Non-Medical): Patient refused   Physical Activity: Insufficiently Active (10/12/2022)    Exercise Vital Sign      Days of Exercise per Week: 6 days      Minutes of Exercise per Session: 20 min   Stress: No Stress Concern Present (10/12/2022)    [employer] [place] [employer] - Occupational Stress Questionnaire      Feeling of Stress : Only a little   Social Connections: Unknown (10/12/2022)    Social Connection and Isolation Panel [NHANES]      Frequency of Communication with Friends and Family: Twice a week      Frequency of Social Gatherings with Friends and Family: More than three times a week      Attends Muslim Services: Never      Active Member of Clubs or Organizations: Patient refused      Attends Club or Organization Meetings: Not on file      Marital Status: Never    Intimate Partner Violence: Not on file   Housing Stability: Unknown (10/12/2022)    Housing Stability Vital Sign       "Unable to Pay for Housing in the Last Year: Patient refused      Number of Places Lived in the Last Year: 1      Unstable Housing in the Last Year: No     Current Outpatient Medications   Medication     b complex vitamins tablet     cetirizine (ZYRTEC) 10 MG tablet     hydrOXYzine (ATARAX) 25 MG tablet     medroxyPROGESTERone (DEPO-PROVERA) 150 MG/ML syringe     Vitamin D3 (CHOLECALCIFEROL) 25 mcg (1000 units) tablet     No current facility-administered medications for this visit.        Allergies   Allergen Reactions     Azithromycin Unknown     Influenza Virus Vaccine Whole [Influenza Vaccines] Rash     Red Dye Rash     Per family, she cannot touch red dye but can consume it.         Review of Systems   Constitutional: Negative for chills and fever.   HENT: Negative for congestion, ear pain, hearing loss and sore throat.    Eyes: Negative for pain and visual disturbance.   Respiratory: Negative for cough and shortness of breath.    Cardiovascular: Negative for chest pain, palpitations and peripheral edema.   Gastrointestinal: Negative for abdominal pain, constipation, diarrhea, heartburn, hematochezia and nausea.   Genitourinary: Negative for dysuria, frequency, genital sores, hematuria and urgency.   Musculoskeletal: Negative for arthralgias, joint swelling and myalgias.   Skin: Negative for rash.   Neurological: Negative for dizziness, weakness, headaches and paresthesias.   Psychiatric/Behavioral: Negative for mood changes. The patient is not nervous/anxious.         OBJECTIVE:   /70   Pulse 112   Temp 99.4  F (37.4  C) (Tympanic)   Resp 16   Ht 1.626 m (5' 4\")   Wt 67.6 kg (149 lb)   SpO2 99%   BMI 25.58 kg/m    Physical Exam  Constitutional: appears to be in no acute distress, comfortable, pleasant.   Eyes: anicteric, conjunctiva clear without drainage or erythema. IRINA.   Ears, Nose and Throat: tympanic membranes gray with LR,  nose without nasal discharge. OP: no erythema to posterior pharynx, " negative post nasal drainage, tonsils +1 no erythema or exudate.  Neck: supple, thyroid palpable,not enlarged, no nodules   Breast: Exam deferred (deferred after discussion of exam options with patient, no symptoms or concerns).   Cardiovascular: regular rate and rhythm, normal S1 and S2, no murmurs, rubs or gallops, peripheral pulses full and symmetric; negative peripheral edema   Respiratory: Air entry throughout. Breathing pattern unlabored without the use of accessory muscles. Clear to auscultation A and P, no wheezes or crackles, normal breath sounds.    Gastrointestinal: rounded, soft. Positive bowel sounds x4, nontender, no masses.   Genitourinary: Exam deferred (deferred after discussion of exam options with patient, no symptoms or concerns, refuses pap).   Musculoskeletal: full range of motion, no edema.   Skin: pink, turgor smooth and elastic. Negative for lesions or dryness.  Neurological: normal gait, no tremor.   Psychological: appropriate mood and affect.   Lymphatic: no cervical, axillary, supraclavicular, or infraclavicular lymphadenopathy.    Diagnostic Test Results:  Labs reviewed in Epic    ASSESSMENT/PLAN:   (Z00.00) Routine general medical examination at a health care facility  (primary encounter diagnosis)  Age appropriate screening and preventative care have been addressed today. Vaccinations have been reviewed - she is aware she needs another HPV vaccine but declines today and it does not sound like she is interested in getting this vaccine in the future. Patient has been advised to undertake routine aerobic activity. Recommend annual vision exams as well as biannual dental exams. They will follow up for annual physical again in one year.   - Adamantly refuses pap smear today. She was counseled on the risks of not performing cervical cancer screening.   - Has never been sexually active, will postpone STI screening today.    (Z30.9) Encounter for contraceptive management, unspecified  "type  Stable, gets her injections at home. No concerns. Does not menstruate with the Depo.   - medroxyPROGESTERone (DEPO-PROVERA) 150 MG/ML syringe          (F41.1) Generalized anxiety disorder  Using hydroxyzine as needed, typically once daily. No need for refills right now but with next request will send 90 day supply with refills.     (F84.0) Autism spectrum disorder        COUNSELING:  Reviewed preventive health counseling, as reflected in patient instructions  Special attention given to:        Regular exercise       Vision screening       Immunizations    Declined: Human Papillomavirus due to Conscientious objector           Contraception       Safe sex practices/STD prevention       Consider Hep C screening for all patients one time for ages 18-79 years       HIV screeninx in teen years, 1x in adult years, and at intervals if high risk      BMI:   Estimated body mass index is 25.58 kg/m  as calculated from the following:    Height as of this encounter: 1.626 m (5' 4\").    Weight as of this encounter: 67.6 kg (149 lb).   Weight management plan: Discussed healthy diet and exercise guidelines      She reports that she has never smoked. She has been exposed to tobacco smoke. She has never used smokeless tobacco.             DARA August CNP  [facility] [place]  "

## 2023-09-08 DIAGNOSIS — F41.1 GENERALIZED ANXIETY DISORDER: ICD-10-CM

## 2023-09-08 RX ORDER — HYDROXYZINE HYDROCHLORIDE 25 MG/1
25 TABLET, FILM COATED ORAL 3 TIMES DAILY PRN
Qty: 30 TABLET | Refills: 5 | Status: SHIPPED | OUTPATIENT
Start: 2023-09-08 | End: 2024-03-12

## 2023-09-08 NOTE — TELEPHONE ENCOUNTER
Prescription approved per [facility] Refill Protocol.  Yoana Beverly RN, BSN  [facility]

## 2023-09-11 DIAGNOSIS — Z30.9 ENCOUNTER FOR CONTRACEPTIVE MANAGEMENT, UNSPECIFIED TYPE: ICD-10-CM

## 2023-09-13 RX ORDER — MEDROXYPROGESTERONE ACETATE 150 MG/ML
INJECTION, SUSPENSION INTRAMUSCULAR
Qty: 1 ML | Refills: 0 | OUTPATIENT
Start: 2023-09-13

## 2023-09-13 NOTE — TELEPHONE ENCOUNTER
Script sent to the pharmacy on 6/23/23 for 1 mL with 3 additional refills on file. Patient should still have refills on file with the pharmacy.    Aedla Foster RN

## 2023-09-15 ENCOUNTER — TELEPHONE (OUTPATIENT)
Dept: PEDIATRICS | Facility: CLINIC | Age: 23
End: 2023-09-15
Payer: COMMERCIAL

## 2023-09-15 DIAGNOSIS — Z30.9 ENCOUNTER FOR CONTRACEPTIVE MANAGEMENT, UNSPECIFIED TYPE: ICD-10-CM

## 2023-09-15 RX ORDER — MEDROXYPROGESTERONE ACETATE 150 MG/ML
INJECTION, SUSPENSION INTRAMUSCULAR
Qty: 1 ML | Refills: 3 | Status: SHIPPED | OUTPATIENT
Start: 2023-09-15 | End: 2024-09-03

## 2023-09-15 NOTE — TELEPHONE ENCOUNTER
Detail Level: Detailed
Received call from pt's grandmother  The pharmacy did not receive the refills for pt's Depo- nighat Malin RN on 9/15/2023 at 1:30 PM   
Detail Level: Generalized
Detail Level: Zone

## 2023-10-04 ENCOUNTER — MYC MEDICAL ADVICE (OUTPATIENT)
Dept: PEDIATRICS | Facility: CLINIC | Age: 23
End: 2023-10-04
Payer: COMMERCIAL

## 2023-11-13 DIAGNOSIS — J30.0 VASOMOTOR RHINITIS: ICD-10-CM

## 2023-11-13 RX ORDER — CETIRIZINE HYDROCHLORIDE 10 MG/1
10 TABLET ORAL DAILY
Qty: 90 TABLET | Refills: 0 | Status: SHIPPED | OUTPATIENT
Start: 2023-11-13 | End: 2024-02-13

## 2024-01-05 ENCOUNTER — MYC MEDICAL ADVICE (OUTPATIENT)
Dept: PEDIATRICS | Facility: CLINIC | Age: 24
End: 2024-01-05
Payer: COMMERCIAL

## 2024-01-05 ASSESSMENT — PATIENT HEALTH QUESTIONNAIRE - PHQ9
SUM OF ALL RESPONSES TO PHQ QUESTIONS 1-9: 0
SUM OF ALL RESPONSES TO PHQ QUESTIONS 1-9: 0
10. IF YOU CHECKED OFF ANY PROBLEMS, HOW DIFFICULT HAVE THESE PROBLEMS MADE IT FOR YOU TO DO YOUR WORK, TAKE CARE OF THINGS AT HOME, OR GET ALONG WITH OTHER PEOPLE: NOT DIFFICULT AT ALL

## 2024-02-12 DIAGNOSIS — J30.0 VASOMOTOR RHINITIS: ICD-10-CM

## 2024-02-13 RX ORDER — CETIRIZINE HYDROCHLORIDE 10 MG/1
10 TABLET ORAL DAILY
Qty: 90 TABLET | Refills: 1 | Status: SHIPPED | OUTPATIENT
Start: 2024-02-13 | End: 2024-08-05

## 2024-03-11 DIAGNOSIS — F41.1 GENERALIZED ANXIETY DISORDER: ICD-10-CM

## 2024-03-12 RX ORDER — HYDROXYZINE HYDROCHLORIDE 25 MG/1
25 TABLET, FILM COATED ORAL 3 TIMES DAILY PRN
Qty: 30 TABLET | Refills: 2 | Status: SHIPPED | OUTPATIENT
Start: 2024-03-12 | End: 2024-06-06

## 2024-05-24 ENCOUNTER — PATIENT OUTREACH (OUTPATIENT)
Dept: CARE COORDINATION | Facility: CLINIC | Age: 24
End: 2024-05-24
Payer: COMMERCIAL

## 2024-06-06 DIAGNOSIS — F41.1 GENERALIZED ANXIETY DISORDER: ICD-10-CM

## 2024-06-06 RX ORDER — HYDROXYZINE HYDROCHLORIDE 25 MG/1
25 TABLET, FILM COATED ORAL 3 TIMES DAILY PRN
Qty: 30 TABLET | Refills: 0 | Status: SHIPPED | OUTPATIENT
Start: 2024-06-06 | End: 2024-07-08

## 2024-06-07 ENCOUNTER — PATIENT OUTREACH (OUTPATIENT)
Dept: CARE COORDINATION | Facility: CLINIC | Age: 24
End: 2024-06-07
Payer: COMMERCIAL

## 2024-07-08 DIAGNOSIS — F41.1 GENERALIZED ANXIETY DISORDER: ICD-10-CM

## 2024-07-08 RX ORDER — HYDROXYZINE HYDROCHLORIDE 25 MG/1
25 TABLET, FILM COATED ORAL 3 TIMES DAILY PRN
Qty: 30 TABLET | Refills: 5 | Status: SHIPPED | OUTPATIENT
Start: 2024-07-08

## 2024-08-05 DIAGNOSIS — J30.0 VASOMOTOR RHINITIS: ICD-10-CM

## 2024-08-05 RX ORDER — CETIRIZINE HYDROCHLORIDE 10 MG/1
10 TABLET ORAL DAILY
Qty: 90 TABLET | Refills: 3 | Status: SHIPPED | OUTPATIENT
Start: 2024-08-05

## 2024-09-03 DIAGNOSIS — Z30.9 ENCOUNTER FOR CONTRACEPTIVE MANAGEMENT, UNSPECIFIED TYPE: ICD-10-CM

## 2024-09-03 RX ORDER — MEDROXYPROGESTERONE ACETATE 150 MG/ML
INJECTION, SUSPENSION INTRAMUSCULAR
Qty: 1 ML | Refills: 0 | Status: SHIPPED | OUTPATIENT
Start: 2024-09-03

## 2024-09-03 NOTE — LETTER
[facility]  [address]  [address]  [place] [address]  Phone: [phone]  Fax: [phone]        September 6, 2024      Dea Garcia                                                                                                                                [address] [id number]            Dear Ms. Garcia,    We have attempted to reach you multiple times because we are concerned about your health care. We recently provided you with a medication refill. Many medications require routine follow-up with your Doctor.      At this time we ask that: You schedule an appointment for your annual physical.    Your prescription for medroxyPROGESTERone (DEPO-PROVERA) 150 MG/ML syringe: Has been refilled for 1 time only so you may have time for the above noted follow-up.    Please call us at [phone] to schedule an appointment or you can schedule online via [employer] if you prefer. If you have any questions or concerns, you can reach us at the number mentioned above.       Thank you,      [facility] Care Team

## 2024-09-22 ENCOUNTER — HEALTH MAINTENANCE LETTER (OUTPATIENT)
Age: 24
End: 2024-09-22

## 2024-11-10 SDOH — HEALTH STABILITY: PHYSICAL HEALTH: ON AVERAGE, HOW MANY DAYS PER WEEK DO YOU ENGAGE IN MODERATE TO STRENUOUS EXERCISE (LIKE A BRISK WALK)?: 4 DAYS

## 2024-11-10 SDOH — HEALTH STABILITY: PHYSICAL HEALTH: ON AVERAGE, HOW MANY MINUTES DO YOU ENGAGE IN EXERCISE AT THIS LEVEL?: 30 MIN

## 2024-11-10 ASSESSMENT — SOCIAL DETERMINANTS OF HEALTH (SDOH): HOW OFTEN DO YOU GET TOGETHER WITH FRIENDS OR RELATIVES?: MORE THAN THREE TIMES A WEEK

## 2024-11-15 ENCOUNTER — OFFICE VISIT (OUTPATIENT)
Dept: PEDIATRICS | Facility: CLINIC | Age: 24
End: 2024-11-15
Payer: COMMERCIAL

## 2024-11-15 VITALS
WEIGHT: 158.3 LBS | TEMPERATURE: 98.6 F | HEART RATE: 102 BPM | RESPIRATION RATE: 16 BRPM | BODY MASS INDEX: 27.02 KG/M2 | DIASTOLIC BLOOD PRESSURE: 72 MMHG | OXYGEN SATURATION: 98 % | SYSTOLIC BLOOD PRESSURE: 110 MMHG | HEIGHT: 64 IN

## 2024-11-15 DIAGNOSIS — Z13.6 ENCOUNTER FOR SCREENING FOR CARDIOVASCULAR DISORDERS: ICD-10-CM

## 2024-11-15 DIAGNOSIS — Z00.00 ENCOUNTER FOR PREVENTATIVE ADULT HEALTH CARE EXAMINATION: Primary | ICD-10-CM

## 2024-11-15 DIAGNOSIS — Z30.9 ENCOUNTER FOR CONTRACEPTIVE MANAGEMENT, UNSPECIFIED TYPE: ICD-10-CM

## 2024-11-15 DIAGNOSIS — F90.9 ATTENTION DEFICIT HYPERACTIVITY DISORDER (ADHD), UNSPECIFIED ADHD TYPE: ICD-10-CM

## 2024-11-15 DIAGNOSIS — F41.1 GENERALIZED ANXIETY DISORDER: ICD-10-CM

## 2024-11-15 DIAGNOSIS — F84.0 AUTISM SPECTRUM DISORDER: ICD-10-CM

## 2024-11-15 PROCEDURE — 99395 PREV VISIT EST AGE 18-39: CPT | Performed by: NURSE PRACTITIONER

## 2024-11-15 PROCEDURE — 36415 COLL VENOUS BLD VENIPUNCTURE: CPT | Performed by: NURSE PRACTITIONER

## 2024-11-15 PROCEDURE — 80061 LIPID PANEL: CPT | Performed by: NURSE PRACTITIONER

## 2024-11-15 PROCEDURE — 96127 BRIEF EMOTIONAL/BEHAV ASSMT: CPT | Performed by: NURSE PRACTITIONER

## 2024-11-15 PROCEDURE — 99213 OFFICE O/P EST LOW 20 MIN: CPT | Mod: 25 | Performed by: NURSE PRACTITIONER

## 2024-11-15 RX ORDER — MEDROXYPROGESTERONE ACETATE 150 MG/ML
INJECTION, SUSPENSION INTRAMUSCULAR
Qty: 1 ML | Refills: 3 | Status: SHIPPED | OUTPATIENT
Start: 2024-11-15

## 2024-11-15 ASSESSMENT — PAIN SCALES - GENERAL: PAINLEVEL_OUTOF10: NO PAIN (0)

## 2024-11-15 NOTE — PROGRESS NOTES
"Preventive Care Visit  [facility] DARA Gilliam CNP, Family Medicine  Nov 15, 2024      Assessment & Plan     Encounter for preventative adult health care examination  Age appropriate screening and preventative care have been addressed today. Vaccinations have been reviewed, she declines all recommended vaccines today. Patient has been advised to follow a balanced diet. They have been advised to undertake routine aerobic activity. Recommend annual vision exams as well as biannual dental exams. They will follow up for annual physical again in one year.   - Declines pap    Encounter for contraceptive management, unspecified type  Completed injections at home. Experiencing amenorrhea on depo. Has no concerns. Refills provided.   - medroxyPROGESTERone (DEPO-PROVERA) 150 MG/ML syringe; INJECT 1ML INTO THE MUSCLE EVERY 3 MONTHS. Has the injection administered at home.    Encounter for screening for cardiovascular disorders  - Lipid panel reflex to direct LDL Fasting    Autism spectrum disorder  Generalized anxiety disorder  Attention deficit hyperactivity disorder (ADHD), unspecified ADHD type  Chronic, stable. Hydroxyzine as needed. No concerns.       BMI  Estimated body mass index is 27.36 kg/m  as calculated from the following:    Height as of this encounter: 1.62 m (5' 3.78\").    Weight as of this encounter: 71.8 kg (158 lb 4.8 oz).   Weight management plan: Discussed healthy diet and exercise guidelines    Counseling  Appropriate preventive services were addressed with this patient via screening, questionnaire, or discussion as appropriate for fall prevention, nutrition, physical activity, Tobacco-use cessation, social engagement, weight loss and cognition.  Checklist reviewing preventive services available has been given to the patient.  Reviewed patient's diet, addressing concerns and/or questions.         Joselin Malin is a 23 year old, presenting for the " Coumadin clinic called back and stated she got the message through pts chart following:  Physical        11/15/2024     1:31 PM   Additional Questions   Roomed by Hanna   Accompanied by mother         11/15/2024     1:31 PM   Patient Reported Additional Medications   Patient reports taking the following new medications no          HPI      Health Care Directive  Patient has a Health Care Directive on file  Advance care planning document is on file and is current.      11/10/2024   General Health   How would you rate your overall physical health? Good   Feel stress (tense, anxious, or unable to sleep) Only a little      (!) STRESS CONCERN      11/10/2024   Nutrition   Three or more servings of calcium each day? Yes   Diet: Regular (no restrictions)   How many servings of fruit and vegetables per day? (!) 0-1   How many sweetened beverages each day? (!) 2            11/10/2024   Exercise   Days per week of moderate/strenous exercise 4 days   Average minutes spent exercising at this level 30 min            11/10/2024   Social Factors   Frequency of gathering with friends or relatives More than three times a week   Worry food won't last until get money to buy more Yes   Food not last or not have enough money for food? Yes   Do you have housing? (Housing is defined as stable permanent housing and does not include staying ouside in a car, in a tent, in an abandoned building, in an overnight shelter, or couch-surfing.) Yes   Are you worried about losing your housing? No   Lack of transportation? No   Unable to get utilities (heat,electricity)? No      (!) FOOD SECURITY CONCERN PRESENT      11/10/2024   Dental   Dentist two times every year? Yes            11/10/2024   TB Screening   Were you born outside of the [place]? No          Today's PHQ-9 Score:       11/15/2024     1:31 PM   PHQ-9 SCORE   PHQ-9 Total Score MyChart 1 (Minimal depression)   PHQ-9 Total Score 1        Patient-reported         11/10/2024   Substance Use   Alcohol more than 3/day or more than 7/wk No   Do you use any other substances  recreationally? No        Social History     Tobacco Use    Smoking status: Never     Passive exposure: Yes    Smokeless tobacco: Never   Vaping Use    Vaping status: Never Used   Substance Use Topics    Alcohol use: Not Currently     Comment: Wine occasionally, 1 glass every few months.    Drug use: No             11/10/2024   One time HIV Screening   Previous HIV test? No          11/10/2024   STI Screening   New sexual partner(s) since last STI/HIV test? 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     Family History   Problem Relation Age of Onset    Anxiety Disorder Mother     Deep Vein Thrombosis (DVT) Mother     Pulmonary Embolism Mother     Endometriosis Mother     Bipolar Disorder Mother     Back Pain Mother     Skin Cancer Mother     Hypertension Mother     Substance Abuse Mother         Abuse of rx Percocet and Adderall    Fibromyalgia Mother     Cardiac Sudden Death Mother         age 40    No Known Problems Father     Scoliosis Sister     Autism Spectrum Disorder Sister     Bipolar Disorder Maternal Grandmother     Alcoholism Maternal  "Grandmother         sober since 1989    Alcoholism Maternal Grandfather     Anxiety Disorder Maternal Aunt     Depression Maternal Aunt     Suicide Other         murder-suicide    Depression Maternal Great-Grandfather     Alcoholism Maternal Great-Grandfather     Depression Maternal Great-Grandmother     Breast Cancer Maternal Great-Grandmother               Objective    Exam  /72 (BP Location: Right arm, Patient Position: Sitting, Cuff Size: Adult Regular)   Pulse 102   Temp 98.6  F (37  C) (Temporal)   Resp 16   Ht 1.62 m (5' 3.78\")   Wt 71.8 kg (158 lb 4.8 oz)   SpO2 98%   BMI 27.36 kg/m     Estimated body mass index is 27.36 kg/m  as calculated from the following:    Height as of this encounter: 1.62 m (5' 3.78\").    Weight as of this encounter: 71.8 kg (158 lb 4.8 oz).    Physical Exam  Constitutional: appears to be in no acute distress, comfortable, pleasant.   Eyes: anicteric, conjunctiva clear without drainage or erythema. IRINA.   Ears, Nose and Throat: tympanic membranes gray with LR,  nose without nasal discharge. OP: no erythema to posterior pharynx, negative post nasal drainage, tonsils +1 no erythema or exudate.  Neck: supple, thyroid palpable,not enlarged, no nodules   Breast: Exam deferred (deferred after discussion of exam options with patient, no symptoms or concerns).   Cardiovascular: regular rate and rhythm, normal S1 and S2, no murmurs, rubs or gallops, peripheral pulses full and symmetric; negative peripheral edema   Respiratory: Air entry throughout. Breathing pattern unlabored without the use of accessory muscles. Clear to auscultation A and P, no wheezes or crackles, normal breath sounds.    Gastrointestinal: rounded, soft. Positive bowel sounds x4, nontender, no masses.   Genitourinary: Exam deferred (deferred after discussion of exam options with patient, no symptoms or concerns, pap declined).   Musculoskeletal: full range of motion, no edema.   Skin: pink, turgor smooth and " elastic. Negative for lesions or dryness.  Neurological: normal gait, no tremor.   Psychological: appropriate mood and affect.   Lymphatic: no cervical, axillary, supraclavicular, or infraclavicular lymphadenopathy.          Signed Electronically by: DARA August CNP    Answers submitted by the patient for this visit:  Patient Health Questionnaire (Submitted on 11/15/2024)  If you checked off any problems, how difficult have these problems made it for you to do your work, take care of things at home, or get along with other people?: Not difficult at all  PHQ9 TOTAL SCORE: 1

## 2024-11-16 LAB
CHOLEST SERPL-MCNC: 219 MG/DL
FASTING STATUS PATIENT QL REPORTED: YES
HDLC SERPL-MCNC: 37 MG/DL
LDLC SERPL CALC-MCNC: 162 MG/DL
NONHDLC SERPL-MCNC: 182 MG/DL
TRIGL SERPL-MCNC: 102 MG/DL

## 2024-12-02 DIAGNOSIS — Z30.9 ENCOUNTER FOR CONTRACEPTIVE MANAGEMENT, UNSPECIFIED TYPE: ICD-10-CM

## 2024-12-02 RX ORDER — MEDROXYPROGESTERONE ACETATE 150 MG/ML
INJECTION, SUSPENSION INTRAMUSCULAR
Qty: 1 ML | Refills: 0 | OUTPATIENT
Start: 2024-12-02

## 2024-12-05 DIAGNOSIS — Z30.9 ENCOUNTER FOR CONTRACEPTIVE MANAGEMENT, UNSPECIFIED TYPE: ICD-10-CM

## 2024-12-05 RX ORDER — MEDROXYPROGESTERONE ACETATE 150 MG/ML
INJECTION, SUSPENSION INTRAMUSCULAR
Qty: 1 ML | Refills: 3 | Status: SHIPPED | OUTPATIENT
Start: 2024-12-05

## 2024-12-05 NOTE — TELEPHONE ENCOUNTER
Patient's grandmother calls stating she spoke to pharmacist at [facility] who told her they do not have any refills on file for patient.    Patient needs filled asap- please send new refill.  [id number] - [place], [place] - [address] [phone]       Contact grandmother at [phone] when sent.      Glory ROSA, - [place] [address]  [facility]- [place]  [facility]

## 2024-12-05 NOTE — TELEPHONE ENCOUNTER
Clinic RN: Please investigate patient's chart or contact patient if the information cannot be found because patient should have refills on file. However per patient's grandmother, pharmacy states they do not have refills.     Valeria OROZCO RN  [facility]

## 2024-12-05 NOTE — TELEPHONE ENCOUNTER
Per review of prescription, this was never sent/received by pharmacy. Rent prescription to pharmacy.    Left message for patient's grandmother to call back. Please advise prescription sent to pharmacy.    Thanks!  Alie ROLLINS RN, BSN  Clinic RN  [facility]

## 2025-02-03 DIAGNOSIS — F41.1 GENERALIZED ANXIETY DISORDER: ICD-10-CM

## 2025-02-03 RX ORDER — HYDROXYZINE HYDROCHLORIDE 25 MG/1
25 TABLET, FILM COATED ORAL 3 TIMES DAILY PRN
Qty: 30 TABLET | Refills: 0 | Status: SHIPPED | OUTPATIENT
Start: 2025-02-03

## 2025-03-03 DIAGNOSIS — F41.1 GENERALIZED ANXIETY DISORDER: ICD-10-CM

## 2025-03-03 RX ORDER — HYDROXYZINE HYDROCHLORIDE 25 MG/1
25 TABLET, FILM COATED ORAL 3 TIMES DAILY PRN
Qty: 90 TABLET | Refills: 1 | Status: SHIPPED | OUTPATIENT
Start: 2025-03-03

## 2025-05-05 DIAGNOSIS — F41.1 GENERALIZED ANXIETY DISORDER: ICD-10-CM

## 2025-05-05 RX ORDER — HYDROXYZINE HYDROCHLORIDE 25 MG/1
25 TABLET, FILM COATED ORAL 3 TIMES DAILY PRN
Qty: 30 TABLET | Refills: 1 | Status: SHIPPED | OUTPATIENT
Start: 2025-05-05

## 2025-07-02 DIAGNOSIS — F41.1 GENERALIZED ANXIETY DISORDER: ICD-10-CM

## 2025-07-02 RX ORDER — HYDROXYZINE HYDROCHLORIDE 25 MG/1
25 TABLET, FILM COATED ORAL 3 TIMES DAILY PRN
Qty: 30 TABLET | Refills: 2 | Status: SHIPPED | OUTPATIENT
Start: 2025-07-02

## 2025-08-04 DIAGNOSIS — J30.0 VASOMOTOR RHINITIS: ICD-10-CM

## 2025-08-04 RX ORDER — CETIRIZINE HYDROCHLORIDE 10 MG/1
10 TABLET ORAL DAILY
Qty: 90 TABLET | Refills: 0 | Status: SHIPPED | OUTPATIENT
Start: 2025-08-04